# Patient Record
Sex: MALE | Race: WHITE | NOT HISPANIC OR LATINO | Employment: OTHER | URBAN - METROPOLITAN AREA
[De-identification: names, ages, dates, MRNs, and addresses within clinical notes are randomized per-mention and may not be internally consistent; named-entity substitution may affect disease eponyms.]

---

## 2017-04-13 ENCOUNTER — APPOINTMENT (OUTPATIENT)
Dept: LAB | Facility: HOSPITAL | Age: 82
End: 2017-04-13
Attending: SURGERY
Payer: MEDICARE

## 2017-04-13 PROBLEM — N18.4 CHRONIC RENAL FAILURE, STAGE 4 (SEVERE): Status: ACTIVE | Noted: 2017-04-13

## 2017-04-13 PROBLEM — I10 ESSENTIAL HYPERTENSION: Status: ACTIVE | Noted: 2017-04-13

## 2017-04-13 PROBLEM — E11.29 TYPE 2 DIABETES MELLITUS WITH RENAL COMPLICATION: Status: ACTIVE | Noted: 2017-04-13

## 2017-04-13 PROCEDURE — 93005 ELECTROCARDIOGRAM TRACING: CPT

## 2017-04-25 ENCOUNTER — ANESTHESIA EVENT (OUTPATIENT)
Dept: SURGERY | Facility: HOSPITAL | Age: 82
End: 2017-04-25
Payer: MEDICARE

## 2017-04-26 ENCOUNTER — HOSPITAL ENCOUNTER (OUTPATIENT)
Dept: PREADMISSION TESTING | Facility: HOSPITAL | Age: 82
Discharge: HOME | End: 2017-04-26
Attending: SURGERY
Payer: MEDICARE

## 2017-04-26 VITALS
HEART RATE: 53 BPM | HEIGHT: 64 IN | SYSTOLIC BLOOD PRESSURE: 207 MMHG | WEIGHT: 143 LBS | OXYGEN SATURATION: 94 % | RESPIRATION RATE: 16 BRPM | BODY MASS INDEX: 24.41 KG/M2 | DIASTOLIC BLOOD PRESSURE: 68 MMHG

## 2017-04-26 RX ORDER — LIDOCAINE HYDROCHLORIDE 10 MG/ML
1 INJECTION, SOLUTION EPIDURAL; INFILTRATION; INTRACAUDAL; PERINEURAL ONCE
Status: CANCELLED | OUTPATIENT
Start: 2017-04-26 | End: 2017-04-26

## 2017-04-26 RX ORDER — SODIUM CHLORIDE 9 MG/ML
INJECTION, SOLUTION INTRAVENOUS CONTINUOUS
Status: CANCELLED | OUTPATIENT
Start: 2017-04-26

## 2017-04-26 NOTE — DISCHARGE INSTRUCTIONS
Your surgery is scheduled for 5/4/17.    Please report to Outpatient Surgery Intake Office on the 2nd FLOOR at 7:00a.m.          INSTRUCTIONS IMPORTANT!!!  ¨ Do not eat or drink after 12 midnight-including water. OK to brush teeth, no   gum, candy or mints!    ¨ Take only these medicines with a small swallow of water-morning of surgery: Carvedilol and hydralazine      ____  No powder, lotions or creams to surgical area.  ____  Please remove all jewelry, including piercings and leave at home.  ____  No money or valuables needed. Please leave at home.  ____  Please bring any documents given by your doctor.  ____  If going home the same day, arrange for a ride home. You will not be able to             drive if Anesthesia was used.  ____  Wear loose fitting clothing. Allow for dressings, bandages.  ____  Stop Aspirin, Ibuprofen, Motrin and Aleve at least 3-5 days before surgery, unless otherwise instructed by your doctor, or the nurse.   You MAY use Tylenol/acetaminophen until day of surgery.  ____  Wash the surgical area with Hibiclens or Dial hand soap the night before surgery, and again the             morning of surgery.  Be sure to rinse hibiclens off completely (if instructed by nurse).  ____  If you take diabetic medication, do not take am of surgery unless instructed by Doctor.  ____  Call MD for temperature above 101 degrees.  ____ Stop taking any Fish Oil supplement or any Vitamins that contain Vitamin E at least 5 days prior to surgery.  ____ Do Not wear your contact lenses the day of your procedure.  You may wear your glasses.        I have read or had read and explained to me, and understand the above information.  Additional comments or instructions:  For additional questions call [phone]     Take a Hibiclens shower twice a day for 3 days prior to surgery, including the morning of surgery.   Gargle with Listerine twice a day for 3 days prior to surgery, including the morning of surgery.      Pre-Op  Bathing Instructions    Before surgery, you can play an important role in your own health.    Because skin is not sterile, we need to be sure that your skin is as free of germs as possible. By following the instructions below, you can reduce the number of germs on your skin before surgery.    IMPORTANT: You will need to shower with a special soap called Hibiclens*, available at any pharmacy.  If you are allergic to Chlorhexidine (the antiseptic in Hibiclens), use an antibacterial soap such as Dial Soap for your preoperative shower.  You will shower with Hibiclens both the night before your surgery and the morning of your surgery.  Do not use Hibiclens on the head, face or genitals to avoid injury to those areas.    STEP #1: THE NIGHT BEFORE YOUR SURGERY     1. Do not shave the area of your body where your surgery will be performed.  2. Shower and wash your hair and body as usual with your normal soap and shampoo.  3. Rinse your hair and body thoroughly after you shower to remove all soap residue.  4. With your hand, apply one packet of Hibiclens soap to the surgical site.   5. Wash the site gently for five (5) minutes. Do not scrub your skin too hard.   6. Do not wash with your regular soap after Hibiclens is used.  7. Rinse your body thoroughly.  8. Pat yourself dry with a clean, soft towel.  9. Do not use lotion, cream, or powder.  10. Wear clean clothes.    STEP #2: THE MORNING OF YOUR SURGERY     1. Repeat Step #1.    * Not to be used by people allergic to Chlorhexidine.          Anesthesia: Monitored Anesthesia Care (MAC)  Youre due to have surgery. During surgery, youll be given medication called anesthesia. This will keep you comfortable and pain-free. Your surgeon will use monitored anesthesia care (MAC). This sheet tells you more about this type of anesthesia.    What is monitored anesthesia care?  MAC keeps you very drowsy during surgery. You may be awake, but you will likely not remember much. And you  wont feel pain. With MAC, medications are given through an IV line into a vein in your arm or hand. A local anesthetic will usually be injected into the skin and muscle around the surgical site to numb it. The anesthesia provider monitors you during the procedure. He or she checks your heart rate and rhythm, blood pressure, and blood oxygen level.  Anesthesia tools and medications that may be near you during your procedure  You will likely have:  · A pulse oximeter on the end of your finger. This measures your blood oxygen level.  · Electrocardiography leads (electrodes) on your chest. These record your heart rate and rhythm.  · Medications given through an IV. These relax you and prevent pain. You may be awake or sleep lightly. If you have local anesthetic, it is injected directly into your skin.  · A facemask to give you oxygen, if needed.  Risks and Possible Complications  MAC has some risks. These include:  · Breathing problems  · Nausea and vomiting  · Allergic reaction to the anesthetic    Anesthesia safety  · Follow all instructions you are given for how long not to eat or drink before your procedure.  · Be sure your doctor knows what medications you take, especially any anti-inflammatory medication or blood thinners. This includes aspirin and any other over-the-counter medications, herbs, and supplements.  · Have an adult family member or friend drive you home after the procedure.  · For the first 24 hours after your surgery:  ¨ Do not drive or use heavy equipment.  ¨ Do not make important decisions or sign documents.  ¨ Avoid alcohol.  ¨ Have someone stay with you, if possible. They can watch for problems and help keep you safe.  Date Last Reviewed: 10/16/2014  © 8473-2304 [employer] [address] [place] [address], [place] [address]. All rights reserved. This information is not intended as a substitute for professional medical care. Always follow your healthcare professional's  instructions.        Arteriovenous (AV) Fistula for Dialysis  An AV fistula is a connection between an artery and a vein. For this procedure, an AV fistula is surgically created using an artery and a vein in your arm. (Your doctor will let you know if another site is to be used.) When the artery and vein are joined, blood flow increases from the artery into the vein. As a result, the vein enlarges over time. The enlarged vein provides easier access to the blood for dialysis (a treatment for kidney failure). This sheet explains the procedure and what to expect.     An AV fistula increases blood flow from the artery into the vein. Over time, the vein becomes stronger and enlarged.   Preparing for the Procedure  Prepare as you have been told. In addition:  · Tell your doctor about all medications you take. This includes over-the-counter drugs. It also includes herbs and other supplements. You may need to stop taking some or all of them before the procedure.  · Follow any directions youre given for not eating or drinking before the procedure.  · Do not allow anyone to draw blood from or take blood pressure on the arm that will have the fistula prior to the procedure.  The Day of the Procedure  The procedure takes about 1-2 hours. Youll likely go home the same day.  Before the procedure begins:  · An IV line is put into a vein in the arm or hand not being used for the procedure. This line supplies fluids and medications.  · To keep you free of pain during the procedure, youre given general anesthesia. This medication puts you into a state like deep sleep through the procedure. Or a nerve block may be used. This medication numbs the arm. With it, you may also be given medication that makes you relaxed and drowsy through the procedure.  During the procedure:  · The skin over your arm may be injected with numbing medication.  · One or more small incisions are then made through the numbed skin. This depends on the size of  your arm and the depth of the vein in your arm.  · The vein is attached to the selected artery.  · Any incisions made are then closed with sutures (stitches), staples, surgical glue, or strips of surgical tape.  After the procedure:  · Youll be asked to keep your arm raised (elevated) as often as possible for at least a week after the procedure.  · Youll be given medications to manage pain as needed.  · Your arm and hand will be checked to make sure blood is flowing through the fistula properly. The feeling of blood rushing through the fistula is called a thrill. It is somewhat similar to the purring of a cat. Youll be taught how to check for this feeling each day to make sure there are no problems with your fistula. Youll also be taught how to care for your fistula at home.  · When its time for you to leave the hospital, have an adult family member or friend ready to drive you home.  Recovering at Home  Once at home, follow all of the instructions youve been given. Be sure to:  · Take all medications as directed.  · Care for your incision as instructed.  · Check for signs of infection at the incision site (see below).  · Avoid heavy lifting and strenuous activities as directed.  · Monitor and care for your fistula as instructed.  · Do your hand and arm exercises as instructed. This usually involves squeezing a ball in your hand for a few minutes each hour.  Call Your Health Care Provider If You Have Any of the Following:  · Fever of 100.4°F or higher  · Signs of infection at the incision site, such as increased redness or swelling, warmth, worsening pain, bleeding, or foul-smelling drainage  · Lack of a thrill (you cant feel it)  · Pain or numbness in your fingers, hand, or arm  · Bleeding, redness, or warmth around your fistula  · Sudden bulging of the fistula (more than usual; a slight bulge is normal)   Follow-Up  Your health care provider will check your fistula within 1 to 2 weeks after the procedure.  It will likely take about 6 to 8 weeks for the fistula to enlarge enough to start dialysis. After that, make sure the fistula is checked each time you have dialysis. Your health care provider may also suggest checkups every 6 months.  Risks and Possible Complications Include:  · Failure of the fistula to work properly  · Long wait before the fistula is ready (up to 6 months)  · Coldness or numbness in the hand (due to blood flowing away from the hand and into the fistula)  · An unsightly bump under the skin (due to enlargement of the fistula)  · Prolonged bleeding from the fistula after dialysis  · Narrowing or weakening of the blood vessels used for the fistula  · Formation of blood clots in the blood vessels used for the fistula  · Risks of anesthesia or any other medications used during the procedure   Living with an AV Fistula  A problem, such as narrowing of the vein (stricture) or infection, can make the fistula unusable. If this happens, you may need other treatments to repair or make a new fistula. To protect your fistula, follow these and any other guidelines youre given:  · Check your fistula as often as your health care provider says. If you cant feel your thrill, let he or she know right away.  · Make sure your fistula is checked before each dialysis treatment.  · Dont let anyone draw blood from or take blood pressure on the arm that has the fistula.  · Wash your hands often and keep the area around your fistula clean.  · Dont sleep on the arm that has the fistula.  · Dont wear tight jewelry or a watch on the arm with your fistula.  · Protect your fistula from cuts, scrapes, or blows.  Date Last Reviewed: 11/26/2014 © 2000-2016 [employer] [address] [place] [address], [place] [address]. All rights reserved. This information is not intended as a substitute for professional medical care. Always follow your healthcare professional's instructions.

## 2017-04-26 NOTE — ANESTHESIA PREPROCEDURE EVALUATION
04/26/2017     Ab Aldrich is a 89 y.o., male is scheduled for insertion of AV graft under MAC/gen on 5/04/2017.      Past Surgical History:   Procedure Laterality Date    CENTRAL VENOUS CATHETER TUNNELED INSERTION DOUBLE LUMEN Right 02/2017         Anesthesia Evaluation    I have reviewed the Patient Summary Reports.    I have reviewed the Nursing Notes.   I have reviewed the Medications.     Review of Systems  Anesthesia Hx:  No problems with previous Anesthesia  History of prior surgery of interest to airway management or planning:  Denies Personal Hx of Anesthesia complications.   Social:  No Alcohol Use, Former Smoker    Hematology/Oncology:  Hematology Normal        EENT/Dental:  EENT/Dental Normal Eyes: Macular degeneration   Cardiovascular:   Exercise tolerance: poor Hypertension, poorly controlled Denies Dysrhythmias.   Denies Angina.     hyperlipidemia REIS  Functional Capacity very limited / < 3 METS, pt can only walk approx 20 feet    Pulmonary:   Denies Shortness of breath.    Renal/:   Chronic Renal Disease, Dialysis  Kidney Function/Disease  Dialysis Dependent, M-W-F    Hepatic/GI:   GERD, well controlled    Neurological:  Neurology Normal    Endocrine:   Diabetes, well controlled, type 2  Diabetes, Type 2 Diabetes , controlled by oral hypoglycemics. , most recent HgA1c value was pt wife states well controlled; does not recall value        Physical Exam  General:  Well nourished    Airway/Jaw/Neck:  Airway Findings: Mouth Opening: Normal Tongue: Normal  General Airway Assessment: Adult  Mallampati: II  TM Distance: Normal, at least 6 cm  Jaw/Neck Findings:  Neck ROM: Extension Decreased, Severe, Decreased flexion, Decreased Lateral Motion  Neck Findings:  Short Neck     Eyes/Ears/Nose:  EYES/EARS/NOSE FINDINGS: Normal   Dental:  Dental Findings: Periodontal disease, Severe    Chest/Lungs:  Chest/Lungs Findings: Clear to auscultation, Normal Respiratory Rate  Fine crackles bilateral bases     Heart/Vascular:  Heart Findings: Normal Heart murmur: negative Vascular Findings:  Vascular Access: Port-a-Cath  Vascular Exam Findings: Right chest     Abdomen:  Abdomen Findings: Normal    Musculoskeletal:  Musculoskeletal Findings: Thoracic Kyphosis     Mental Status:  Mental Status Findings: Normal      EKG 4/14/2017  Sinus bradycardia with 1st degree A-V block  Left axis deviation  ST and T wave abnormality, consider lateral ischemia  Prolonged QT  Abnormal ECG  No previous ECGs available  Confirmed by Reji Jeronimo MD ([id number]) on 4/14/2017 10:18:04 AM  Reviewed by: Abel Kim MD on 4/14/2017 12:15 PM    Anesthesia Plan  Type of Anesthesia, risks & benefits discussed:  Anesthesia Type:  general, MAC  Patient's Preference:   Intra-op Monitoring Plan:   Intra-op Monitoring Plan Comments:   Post Op Pain Control Plan:   Post Op Pain Control Plan Comments:   Induction:   IV  Beta Blocker:         Informed Consent: Patient understands risks and agrees with Anesthesia plan.  Questions answered.   ASA Score: 3     Day of Surgery Review of History & Physical:        Anesthesia Plan Notes: Anesthesia consent will be obtained prior to procedure on 5/04/2017.          Ready For Surgery From Anesthesia Perspective.

## 2017-04-26 NOTE — IP AVS SNAPSHOT
[facility]  [address]  [place] [address]  Phone: [phone]           Patient Discharge Instructions  Our goal is to set you up for success. This packet includes information on your condition, medications, and your home care. It will help you care for yourself to prevent having to return to the hospital.     Please ask your nurse if you have any questions.      There are many details to remember when preparing for your surgery. Here is what you will need to do, please ask your nurse if there are more specific instructions and if you have any questions:    1. Before procedure Do not smoke or drink alcoholic beverages 24 hours prior to your procedure. Do not eat or drink anything 8 hours before your procedure - this includes gum, mints, and candy.     2. Day of procedure Please remove all jewelry for the procedure. If you wear contact lenses, dentures, hearing aids or glasses, bring a container to put them in during your surgery and give to a family member.  If your doctor has scheduled you for an overnight stay, bring a small overnight bag with any personal items that you need.      3. After procedure  Make arrangements in advance for transportation home by a responsible adult. It is not safe to drive a vehicle during the 24 hours following surgery.     PLEASE NOTE: You may be contacted the day before your surgery to confirm your surgery date and arrival time. The Surgery schedule has many variables which may affect the time of your surgery case. Family members should be available if your surgery time changes.               ** Verify the list of medication(s) below is accurate and up to date. Carry this with you in case of emergency. If your medications have changed, please notify your healthcare provider.             Medication List      TAKE these medications        Additional Info                      allopurinol 100 MG tablet   Commonly known as:  ZYLOPRIM   Refills:  1      Begin Date     AM    Noon    PM    Bedtime       atorvastatin 40 MG tablet   Commonly known as:  LIPITOR   Refills:  1      Begin Date    AM    Noon    PM    Bedtime       carvedilol 6.25 MG tablet   Commonly known as:  COREG   Refills:  2      Begin Date    AM    Noon    PM    Bedtime       hydrALAZINE 25 MG tablet   Commonly known as:  APRESOLINE   Refills:  2      Begin Date    AM    Noon    PM    Bedtime       pioglitazone 45 MG tablet   Commonly known as:  ACTOS   Refills:  3      Begin Date    AM    Noon    PM    Bedtime                  Please bring to all follow up appointments:    1. A copy of your discharge instructions.  2. All medicines you are currently taking in their original bottles.  3. Identification and insurance card.    Please arrive 15 minutes ahead of scheduled appointment time.    Please call 24 hours in advance if you must reschedule your appointment and/or time.        Your Scheduled Appointments     May 02, 2017  2:45 PM CDT   [facility]in [facility]   [facility]    [address]  [place] [address]   [phone]              Your Future Surgeries/Procedures     May 04, 2017   Surgery with Abel Kim MD   [facility] ([facility])    [address]  [place] [address]   [phone]                  Discharge Instructions       Your surgery is scheduled for 5/4/17.    Please report to Outpatient Surgery Intake Office on the 2nd FLOOR at 7:00a.m.          INSTRUCTIONS IMPORTANT!!!  ¨ Do not eat or drink after 12 midnight-including water. OK to brush teeth, no   gum, candy or mints!    ¨ Take only these medicines with a small swallow of water-morning of surgery: Carvedilol and hydralazine      ____  No powder, lotions or creams to surgical area.  ____  Please remove all jewelry, including piercings and leave at home.  ____  No money or valuables needed. Please leave at home.  ____  Please bring any documents  given by your doctor.  ____  If going home the same day, arrange for a ride home. You will not be able to             drive if Anesthesia was used.  ____  Wear loose fitting clothing. Allow for dressings, bandages.  ____  Stop Aspirin, Ibuprofen, Motrin and Aleve at least 3-5 days before surgery, unless otherwise instructed by your doctor, or the nurse.   You MAY use Tylenol/acetaminophen until day of surgery.  ____  Wash the surgical area with Hibiclens or Dial hand soap the night before surgery, and again the             morning of surgery.  Be sure to rinse hibiclens off completely (if instructed by nurse).  ____  If you take diabetic medication, do not take am of surgery unless instructed by Doctor.  ____  Call MD for temperature above 101 degrees.  ____ Stop taking any Fish Oil supplement or any Vitamins that contain Vitamin E at least 5 days prior to surgery.  ____ Do Not wear your contact lenses the day of your procedure.  You may wear your glasses.        I have read or had read and explained to me, and understand the above information.  Additional comments or instructions:  For additional questions call [phone]     Take a Hibiclens shower twice a day for 3 days prior to surgery, including the morning of surgery.   Gargle with Listerine twice a day for 3 days prior to surgery, including the morning of surgery.      Pre-Op Bathing Instructions    Before surgery, you can play an important role in your own health.    Because skin is not sterile, we need to be sure that your skin is as free of germs as possible. By following the instructions below, you can reduce the number of germs on your skin before surgery.    IMPORTANT: You will need to shower with a special soap called Hibiclens*, available at any pharmacy.  If you are allergic to Chlorhexidine (the antiseptic in Hibiclens), use an antibacterial soap such as Dial Soap for your preoperative shower.  You will shower with Hibiclens both the night before  your surgery and the morning of your surgery.  Do not use Hibiclens on the head, face or genitals to avoid injury to those areas.    STEP #1: THE NIGHT BEFORE YOUR SURGERY     1. Do not shave the area of your body where your surgery will be performed.  2. Shower and wash your hair and body as usual with your normal soap and shampoo.  3. Rinse your hair and body thoroughly after you shower to remove all soap residue.  4. With your hand, apply one packet of Hibiclens soap to the surgical site.   5. Wash the site gently for five (5) minutes. Do not scrub your skin too hard.   6. Do not wash with your regular soap after Hibiclens is used.  7. Rinse your body thoroughly.  8. Pat yourself dry with a clean, soft towel.  9. Do not use lotion, cream, or powder.  10. Wear clean clothes.    STEP #2: THE MORNING OF YOUR SURGERY     1. Repeat Step #1.    * Not to be used by people allergic to Chlorhexidine.          Anesthesia: Monitored Anesthesia Care (MAC)  Youre due to have surgery. During surgery, youll be given medication called anesthesia. This will keep you comfortable and pain-free. Your surgeon will use monitored anesthesia care (MAC). This sheet tells you more about this type of anesthesia.    What is monitored anesthesia care?  MAC keeps you very drowsy during surgery. You may be awake, but you will likely not remember much. And you wont feel pain. With MAC, medications are given through an IV line into a vein in your arm or hand. A local anesthetic will usually be injected into the skin and muscle around the surgical site to numb it. The anesthesia provider monitors you during the procedure. He or she checks your heart rate and rhythm, blood pressure, and blood oxygen level.  Anesthesia tools and medications that may be near you during your procedure  You will likely have:  · A pulse oximeter on the end of your finger. This measures your blood oxygen level.  · Electrocardiography leads (electrodes) on your chest.  These record your heart rate and rhythm.  · Medications given through an IV. These relax you and prevent pain. You may be awake or sleep lightly. If you have local anesthetic, it is injected directly into your skin.  · A facemask to give you oxygen, if needed.  Risks and Possible Complications  MAC has some risks. These include:  · Breathing problems  · Nausea and vomiting  · Allergic reaction to the anesthetic    Anesthesia safety  · Follow all instructions you are given for how long not to eat or drink before your procedure.  · Be sure your doctor knows what medications you take, especially any anti-inflammatory medication or blood thinners. This includes aspirin and any other over-the-counter medications, herbs, and supplements.  · Have an adult family member or friend drive you home after the procedure.  · For the first 24 hours after your surgery:  ¨ Do not drive or use heavy equipment.  ¨ Do not make important decisions or sign documents.  ¨ Avoid alcohol.  ¨ Have someone stay with you, if possible. They can watch for problems and help keep you safe.  Date Last Reviewed: 10/16/2014  © 6359-3790 [employer]. [address] [place] [address]. All rights reserved. This information is not intended as a substitute for professional medical care. Always follow your healthcare professional's instructions.        Arteriovenous (AV) Fistula for Dialysis  An AV fistula is a connection between an artery and a vein. For this procedure, an AV fistula is surgically created using an artery and a vein in your arm. (Your doctor will let you know if another site is to be used.) When the artery and vein are joined, blood flow increases from the artery into the vein. As a result, the vein enlarges over time. The enlarged vein provides easier access to the blood for dialysis (a treatment for kidney failure). This sheet explains the procedure and what to expect.     An AV fistula increases blood flow from the  artery into the vein. Over time, the vein becomes stronger and enlarged.   Preparing for the Procedure  Prepare as you have been told. In addition:  · Tell your doctor about all medications you take. This includes over-the-counter drugs. It also includes herbs and other supplements. You may need to stop taking some or all of them before the procedure.  · Follow any directions youre given for not eating or drinking before the procedure.  · Do not allow anyone to draw blood from or take blood pressure on the arm that will have the fistula prior to the procedure.  The Day of the Procedure  The procedure takes about 1-2 hours. Youll likely go home the same day.  Before the procedure begins:  · An IV line is put into a vein in the arm or hand not being used for the procedure. This line supplies fluids and medications.  · To keep you free of pain during the procedure, youre given general anesthesia. This medication puts you into a state like deep sleep through the procedure. Or a nerve block may be used. This medication numbs the arm. With it, you may also be given medication that makes you relaxed and drowsy through the procedure.  During the procedure:  · The skin over your arm may be injected with numbing medication.  · One or more small incisions are then made through the numbed skin. This depends on the size of your arm and the depth of the vein in your arm.  · The vein is attached to the selected artery.  · Any incisions made are then closed with sutures (stitches), staples, surgical glue, or strips of surgical tape.  After the procedure:  · Youll be asked to keep your arm raised (elevated) as often as possible for at least a week after the procedure.  · Youll be given medications to manage pain as needed.  · Your arm and hand will be checked to make sure blood is flowing through the fistula properly. The feeling of blood rushing through the fistula is called a thrill. It is somewhat similar to the purring of a  cat. Youll be taught how to check for this feeling each day to make sure there are no problems with your fistula. Youll also be taught how to care for your fistula at home.  · When its time for you to leave the hospital, have an adult family member or friend ready to drive you home.  Recovering at Home  Once at home, follow all of the instructions youve been given. Be sure to:  · Take all medications as directed.  · Care for your incision as instructed.  · Check for signs of infection at the incision site (see below).  · Avoid heavy lifting and strenuous activities as directed.  · Monitor and care for your fistula as instructed.  · Do your hand and arm exercises as instructed. This usually involves squeezing a ball in your hand for a few minutes each hour.  Call Your Health Care Provider If You Have Any of the Following:  · Fever of 100.4°F or higher  · Signs of infection at the incision site, such as increased redness or swelling, warmth, worsening pain, bleeding, or foul-smelling drainage  · Lack of a thrill (you cant feel it)  · Pain or numbness in your fingers, hand, or arm  · Bleeding, redness, or warmth around your fistula  · Sudden bulging of the fistula (more than usual; a slight bulge is normal)   Follow-Up  Your health care provider will check your fistula within 1 to 2 weeks after the procedure. It will likely take about 6 to 8 weeks for the fistula to enlarge enough to start dialysis. After that, make sure the fistula is checked each time you have dialysis. Your health care provider may also suggest checkups every 6 months.  Risks and Possible Complications Include:  · Failure of the fistula to work properly  · Long wait before the fistula is ready (up to 6 months)  · Coldness or numbness in the hand (due to blood flowing away from the hand and into the fistula)  · An unsightly bump under the skin (due to enlargement of the fistula)  · Prolonged bleeding from the fistula after dialysis  · Narrowing  or weakening of the blood vessels used for the fistula  · Formation of blood clots in the blood vessels used for the fistula  · Risks of anesthesia or any other medications used during the procedure   Living with an AV Fistula  A problem, such as narrowing of the vein (stricture) or infection, can make the fistula unusable. If this happens, you may need other treatments to repair or make a new fistula. To protect your fistula, follow these and any other guidelines youre given:  · Check your fistula as often as your health care provider says. If you cant feel your thrill, let he or she know right away.  · Make sure your fistula is checked before each dialysis treatment.  · Dont let anyone draw blood from or take blood pressure on the arm that has the fistula.  · Wash your hands often and keep the area around your fistula clean.  · Dont sleep on the arm that has the fistula.  · Dont wear tight jewelry or a watch on the arm with your fistula.  · Protect your fistula from cuts, scrapes, or blows.  Date Last Reviewed: 11/26/2014  © 7975-4294 [employer] [address] [place] [address]. All rights reserved. This information is not intended as a substitute for professional medical care. Always follow your healthcare professional's instructions.            Admission Information     Date & Time Provider Department CSN    4/26/2017  9:00 AM Abel Kim MD [facility] [id number]      Care Providers     Provider Role Specialty Primary office phone    Abel Kim MD Attending Provider General Surgery [phone]      Recent Lab Values     No lab values to display.      Allergies as of 4/26/2017     No Known Allergies      [facility]     [facility] Nurse Care Line - 24/7 Assistance  Unless otherwise directed by your provider, please contact [facility], our nurse care line that is available for 24/7 assistance.     Registered nurses in the [facility] provide clinical advisement, health education, appointment booking, and other advisory services.  Call for this free service at [phone].        Advance Directives     An advance directive is a document which, in the event you are no longer able to make decisions for yourself, tells your healthcare team what kind of treatment you do or do not want to receive, or who you would like to make those decisions for you.  If you do not currently have an advance directive, [facility] encourages you to create one.  For more information call:  [phone]), [phone] ([phone]),  or log on to [email or web address].        Smoking Cessation     If you would like to quit smoking:   You may be eligible for free services if you are a [place] resident and started smoking cigarettes before September 1, 1988.  Call the Smoking Cessation Trust ([facility]) toll free at (577) 114-5532 or (242) 175-0079.   Call 4-368-QUIT-NOW if you do not meet the above criteria.   Contact us via email: [email or web address]   View our website for more information: [email or web address]        Language Assistance Services     ATTENTION: Language assistance services are available, free of charge. Please call [phone].      ATENCIÓN: Si habla español, tiene a swartz disposición servicios gratuitos de asistencia lingüística. Llame al [phone].     CHÚ Ý: N?u b?n nói Ti?ng Vi?t, có các d?ch v? h? tr? ngôn ng? mi?n phí dành cho b?n. G?i s? [phone].        Chronic Kindey Disease Education             Diabetes Discharge Instructions                                   [facility] Sign-Up     Activating your [facility] account is as easy as 1-2-3!     1) Visit [email or web address], select Sign Up Now, enter this activation code and your date of birth, then select Next.  [email or web address]  Expires: 5/28/2017  3:20 PM      2) Create a username and password to use when you visit [facility] in the future and select  a security question in case you lose your password and select Next.    3) Enter your e-mail address and click Sign Up!    Additional Information  If you have questions, please e-mail [email or web address] or call [phone] to talk to our [facility] staff. Remember, [facility] is NOT to be used for urgent needs. For medical emergencies, dial 911.          [facility] complies with applicable Federal civil rights laws and does not discriminate on the basis of race, color, national origin, age, disability, or sex.

## 2017-05-02 ENCOUNTER — HOSPITAL ENCOUNTER (OUTPATIENT)
Dept: RADIOLOGY | Facility: HOSPITAL | Age: 82
Discharge: HOME | End: 2017-05-02
Attending: SURGERY
Payer: MEDICARE

## 2017-05-02 DIAGNOSIS — Z79.4 TYPE 2 DIABETES MELLITUS WITH STAGE 4 CHRONIC KIDNEY DISEASE, WITH LONG-TERM CURRENT USE OF INSULIN: ICD-10-CM

## 2017-05-02 DIAGNOSIS — N18.4 TYPE 2 DIABETES MELLITUS WITH STAGE 4 CHRONIC KIDNEY DISEASE, WITH LONG-TERM CURRENT USE OF INSULIN: ICD-10-CM

## 2017-05-02 DIAGNOSIS — E11.22 TYPE 2 DIABETES MELLITUS WITH STAGE 4 CHRONIC KIDNEY DISEASE, WITH LONG-TERM CURRENT USE OF INSULIN: ICD-10-CM

## 2017-05-02 DIAGNOSIS — I10 ESSENTIAL HYPERTENSION: ICD-10-CM

## 2017-05-02 DIAGNOSIS — N18.4 CHRONIC RENAL FAILURE, STAGE 4 (SEVERE): ICD-10-CM

## 2017-05-02 PROCEDURE — G0365 VESSEL MAPPING HEMO ACCESS: HCPCS | Mod: 26,,, | Performed by: RADIOLOGY

## 2017-05-02 PROCEDURE — G0365 VESSEL MAPPING HEMO ACCESS: HCPCS | Mod: TC

## 2017-05-04 ENCOUNTER — ANESTHESIA (OUTPATIENT)
Dept: SURGERY | Facility: HOSPITAL | Age: 82
End: 2017-05-04
Payer: MEDICARE

## 2017-05-04 ENCOUNTER — SURGERY (OUTPATIENT)
Age: 82
End: 2017-05-04

## 2017-05-04 ENCOUNTER — HOSPITAL ENCOUNTER (OUTPATIENT)
Facility: HOSPITAL | Age: 82
Discharge: HOME | End: 2017-05-04
Attending: SURGERY | Admitting: SURGERY
Payer: MEDICARE

## 2017-05-04 VITALS
BODY MASS INDEX: 25.95 KG/M2 | WEIGHT: 141 LBS | RESPIRATION RATE: 18 BRPM | TEMPERATURE: 98 F | SYSTOLIC BLOOD PRESSURE: 148 MMHG | HEART RATE: 66 BPM | DIASTOLIC BLOOD PRESSURE: 67 MMHG | OXYGEN SATURATION: 95 % | HEIGHT: 62 IN

## 2017-05-04 DIAGNOSIS — N18.4 CHRONIC RENAL FAILURE, STAGE 4 (SEVERE): ICD-10-CM

## 2017-05-04 DIAGNOSIS — E11.22 TYPE 2 DIABETES MELLITUS WITH STAGE 4 CHRONIC KIDNEY DISEASE, WITH LONG-TERM CURRENT USE OF INSULIN: ICD-10-CM

## 2017-05-04 DIAGNOSIS — I10 ESSENTIAL HYPERTENSION: ICD-10-CM

## 2017-05-04 DIAGNOSIS — N18.4 TYPE 2 DIABETES MELLITUS WITH STAGE 4 CHRONIC KIDNEY DISEASE, WITH LONG-TERM CURRENT USE OF INSULIN: ICD-10-CM

## 2017-05-04 DIAGNOSIS — Z79.4 TYPE 2 DIABETES MELLITUS WITH STAGE 4 CHRONIC KIDNEY DISEASE, WITH LONG-TERM CURRENT USE OF INSULIN: ICD-10-CM

## 2017-05-04 LAB
ANION GAP SERPL CALC-SCNC: 8 MMOL/L
BUN SERPL-MCNC: 31 MG/DL
CALCIUM SERPL-MCNC: 8.5 MG/DL
CHLORIDE SERPL-SCNC: 103 MMOL/L
CO2 SERPL-SCNC: 25 MMOL/L
CREAT SERPL-MCNC: 3 MG/DL
EST. GFR  (AFRICAN AMERICAN): 20 ML/MIN/1.73 M^2
EST. GFR  (NON AFRICAN AMERICAN): 18 ML/MIN/1.73 M^2
GLUCOSE SERPL-MCNC: 135 MG/DL
POCT GLUCOSE: 134 MG/DL (ref 70–110)
POTASSIUM SERPL-SCNC: 4 MMOL/L
SODIUM SERPL-SCNC: 136 MMOL/L

## 2017-05-04 PROCEDURE — 37000009 HC ANESTHESIA EA ADD 15 MINS: Performed by: SURGERY

## 2017-05-04 PROCEDURE — 37000008 HC ANESTHESIA 1ST 15 MINUTES: Performed by: SURGERY

## 2017-05-04 PROCEDURE — 25000003 PHARM REV CODE 250: Performed by: NURSE PRACTITIONER

## 2017-05-04 PROCEDURE — 80048 BASIC METABOLIC PNL TOTAL CA: CPT

## 2017-05-04 PROCEDURE — 63600175 PHARM REV CODE 636 W HCPCS: Performed by: NURSE ANESTHETIST, CERTIFIED REGISTERED

## 2017-05-04 PROCEDURE — 71000015 HC POSTOP RECOV 1ST HR: Performed by: SURGERY

## 2017-05-04 PROCEDURE — 36415 COLL VENOUS BLD VENIPUNCTURE: CPT

## 2017-05-04 PROCEDURE — 36000707: Performed by: SURGERY

## 2017-05-04 PROCEDURE — 25000003 PHARM REV CODE 250: Performed by: SURGERY

## 2017-05-04 PROCEDURE — 27201423 OPTIME MED/SURG SUP & DEVICES STERILE SUPPLY: Performed by: SURGERY

## 2017-05-04 PROCEDURE — 36000706: Performed by: SURGERY

## 2017-05-04 PROCEDURE — 63600175 PHARM REV CODE 636 W HCPCS: Performed by: SURGERY

## 2017-05-04 PROCEDURE — 25000003 PHARM REV CODE 250: Performed by: NURSE ANESTHETIST, CERTIFIED REGISTERED

## 2017-05-04 RX ORDER — SODIUM CHLORIDE 9 MG/ML
INJECTION, SOLUTION INTRAVENOUS CONTINUOUS
Status: DISCONTINUED | OUTPATIENT
Start: 2017-05-04 | End: 2017-05-04

## 2017-05-04 RX ORDER — PROPOFOL 10 MG/ML
VIAL (ML) INTRAVENOUS
Status: DISCONTINUED | OUTPATIENT
Start: 2017-05-04 | End: 2017-05-04

## 2017-05-04 RX ORDER — LIDOCAINE HCL/PF 100 MG/5ML
SYRINGE (ML) INTRAVENOUS
Status: DISCONTINUED | OUTPATIENT
Start: 2017-05-04 | End: 2017-05-04

## 2017-05-04 RX ORDER — PROPOFOL 10 MG/ML
VIAL (ML) INTRAVENOUS CONTINUOUS PRN
Status: DISCONTINUED | OUTPATIENT
Start: 2017-05-04 | End: 2017-05-04

## 2017-05-04 RX ORDER — HEPARIN SODIUM 1000 [USP'U]/ML
INJECTION, SOLUTION INTRAVENOUS; SUBCUTANEOUS
Status: DISCONTINUED | OUTPATIENT
Start: 2017-05-04 | End: 2017-05-04 | Stop reason: HOSPADM

## 2017-05-04 RX ORDER — HYDROCODONE BITARTRATE AND ACETAMINOPHEN 5; 325 MG/1; MG/1
1 TABLET ORAL EVERY 6 HOURS PRN
Qty: 20 TABLET | Refills: 0
Start: 2017-05-04 | End: 2018-02-04

## 2017-05-04 RX ORDER — LIDOCAINE HYDROCHLORIDE 10 MG/ML
1 INJECTION, SOLUTION EPIDURAL; INFILTRATION; INTRACAUDAL; PERINEURAL ONCE
Status: DISCONTINUED | OUTPATIENT
Start: 2017-05-04 | End: 2017-05-04 | Stop reason: HOSPADM

## 2017-05-04 RX ORDER — SODIUM CHLORIDE 9 MG/ML
INJECTION, SOLUTION INTRAVENOUS CONTINUOUS
Status: DISCONTINUED | OUTPATIENT
Start: 2017-05-04 | End: 2017-05-04 | Stop reason: HOSPADM

## 2017-05-04 RX ORDER — LIDOCAINE HYDROCHLORIDE 10 MG/ML
INJECTION, SOLUTION EPIDURAL; INFILTRATION; INTRACAUDAL; PERINEURAL
Status: DISCONTINUED | OUTPATIENT
Start: 2017-05-04 | End: 2017-05-04 | Stop reason: HOSPADM

## 2017-05-04 RX ORDER — CEFAZOLIN SODIUM 2 G/50ML
2 SOLUTION INTRAVENOUS
Status: COMPLETED | OUTPATIENT
Start: 2017-05-04 | End: 2017-05-04

## 2017-05-04 RX ORDER — BACITRACIN 50000 [IU]/1
INJECTION, POWDER, FOR SOLUTION INTRAMUSCULAR
Status: DISCONTINUED | OUTPATIENT
Start: 2017-05-04 | End: 2017-05-04 | Stop reason: HOSPADM

## 2017-05-04 RX ORDER — HYDROCODONE BITARTRATE AND ACETAMINOPHEN 5; 325 MG/1; MG/1
1 TABLET ORAL EVERY 4 HOURS PRN
Status: DISCONTINUED | OUTPATIENT
Start: 2017-05-04 | End: 2017-05-04 | Stop reason: HOSPADM

## 2017-05-04 RX ADMIN — CEFAZOLIN SODIUM 2 G: 2 SOLUTION INTRAVENOUS at 11:05

## 2017-05-04 RX ADMIN — HEPARIN SODIUM 5000 UNITS: 1000 INJECTION, SOLUTION INTRAVENOUS; SUBCUTANEOUS at 11:05

## 2017-05-04 RX ADMIN — LIDOCAINE HYDROCHLORIDE 25 MG: 20 INJECTION, SOLUTION INTRAVENOUS at 11:05

## 2017-05-04 RX ADMIN — SODIUM CHLORIDE: 0.9 INJECTION, SOLUTION INTRAVENOUS at 11:05

## 2017-05-04 RX ADMIN — LIDOCAINE HYDROCHLORIDE 10 ML: 10 INJECTION, SOLUTION EPIDURAL; INFILTRATION; INTRACAUDAL; PERINEURAL at 12:05

## 2017-05-04 RX ADMIN — PROPOFOL 100 MCG/KG/MIN: 10 INJECTION, EMULSION INTRAVENOUS at 11:05

## 2017-05-04 RX ADMIN — PROPOFOL 30 MG: 10 INJECTION, EMULSION INTRAVENOUS at 11:05

## 2017-05-04 RX ADMIN — BACITRACIN 50000 UNITS: 5000 INJECTION, POWDER, FOR SOLUTION INTRAMUSCULAR at 11:05

## 2017-05-04 NOTE — PLAN OF CARE
Criteria met for discharge. IV removed, instructions explained,copy given with RX. Pt and family all verbalize understanding of instructions, have no further questions. Denies pain,tolerating po well. Discharged home with family in good condition.

## 2017-05-04 NOTE — DISCHARGE INSTRUCTIONS
Arteriovenous (AV) Fistula for Dialysis  An AV fistula is a connection between an artery and a vein. For this procedure, an AV fistula is surgically created using an artery and a vein in your arm. (Your doctor will let you know if another site is to be used.) When the artery and vein are joined, blood flow increases from the artery into the vein. As a result, the vein enlarges over time. The enlarged vein provides easier access to the blood for dialysis (a treatment for kidney failure). This sheet explains the procedure and what to expect.       An AV fistula increases blood flow from the artery into the vein. Over time, the vein becomes stronger and enlarged.     Preparing for the Procedure  Prepare as you have been told. In addition:  · Tell your doctor about all medications you take. This includes over-the-counter drugs. It also includes herbs and other supplements. You may need to stop taking some or all of them before the procedure.  · Follow any directions youre given for not eating or drinking before the procedure.  · Do not allow anyone to draw blood from or take blood pressure on the arm that will have the fistula prior to the procedure.  ·   The Day of the Procedure  The procedure takes about 1-2 hours. Youll likely go home the same day.  Before the procedure begins:  · An IV line is put into a vein in the arm or hand not being used for the procedure. This line supplies fluids and medications.  · To keep you free of pain during the procedure, youre given general anesthesia. This medication puts you into a state like deep sleep through the procedure. Or a nerve block may be used. This medication numbs the arm. With it, you may also be given medication that makes you relaxed and drowsy through the procedure.  ·   During the procedure:  · The skin over your arm may be injected with numbing medication.  · One or more small incisions are then made through the numbed skin. This depends on the size of your  arm and the depth of the vein in your arm.  · The vein is attached to the selected artery.  · Any incisions made are then closed with sutures (stitches), staples, surgical glue, or strips of surgical tape.  ·   After the procedure:  · Youll be asked to keep your arm raised (elevated) as often as possible for at least a week after the procedure.  · Youll be given medications to manage pain as needed.  · Your arm and hand will be checked to make sure blood is flowing through the fistula properly. The feeling of blood rushing through the fistula is called a thrill. It is somewhat similar to the purring of a cat. Youll be taught how to check for this feeling each day to make sure there are no problems with your fistula. Youll also be taught how to care for your fistula at home.  · When its time for you to leave the hospital, have an adult family member or friend ready to drive you home.  ·   Recovering at Home  Once at home, follow all of the instructions youve been given. Be sure to:  · Take all medications as directed.  · Care for your incision as instructed.  · Check for signs of infection at the incision site (see below).  · Avoid heavy lifting and strenuous activities as directed.  · Monitor and care for your fistula as instructed.  · Do your hand and arm exercises as instructed. This usually involves squeezing a ball in your hand for a few minutes each hour.  ·   Call Your Health Care Provider If You Have Any of the Following:  · Fever of 100.4°F or higher  · Signs of infection at the incision site, such as increased redness or swelling, warmth, worsening pain, bleeding, or foul-smelling drainage  · Lack of a thrill (you cant feel it)  · Pain or numbness in your fingers, hand, or arm  · Bleeding, redness, or warmth around your fistula  · Sudden bulging of the fistula (more than usual; a slight bulge is normal)   Follow-Up  Your health care provider will check your fistula within 1 to 2 weeks after the  procedure. It will likely take about 6 to 8 weeks for the fistula to enlarge enough to start dialysis. After that, make sure the fistula is checked each time you have dialysis. Your health care provider may also suggest checkups every 6 months.  Risks and Possible Complications Include:  · Failure of the fistula to work properly  · Long wait before the fistula is ready (up to 6 months)  · Coldness or numbness in the hand (due to blood flowing away from the hand and into the fistula)  · An unsightly bump under the skin (due to enlargement of the fistula)  · Prolonged bleeding from the fistula after dialysis  · Narrowing or weakening of the blood vessels used for the fistula  · Formation of blood clots in the blood vessels used for the fistula  · Risks of anesthesia or any other medications used during the procedure       Living with an AV Fistula  A problem, such as narrowing of the vein (stricture) or infection, can make the fistula unusable. If this happens, you may need other treatments to repair or make a new fistula. To protect your fistula, follow these and any other guidelines youre given:  · Check your fistula as often as your health care provider says. If you cant feel your thrill, let he or she know right away.  · Make sure your fistula is checked before each dialysis treatment.  · Dont let anyone draw blood from or take blood pressure on the arm that has the fistula.  · Wash your hands often and keep the area around your fistula clean.  · Dont sleep on the arm that has the fistula.  · Dont wear tight jewelry or a watch on the arm with your fistula.  · Protect your fistula from cuts, scrapes, or blows.    Notify Dr. Kim for any problems or concerns.  May remove the dressing in 2 days. Then apply band aids to site until completed dry and starting to scab.

## 2017-05-04 NOTE — BRIEF OP NOTE
[facility]  Brief Operative Note     SUMMARY     Surgery Date: 5/4/2017     Surgeon(s) and Role:     * Abel Kim MD - Primary    Assisting Surgeon: None    Pre-op Diagnosis:  Essential hypertension [I10]  Chronic renal failure, stage 4 (severe) [N18.4]  Type 2 diabetes mellitus with stage 4 chronic kidney disease, with long-term current use of insulin [E11.22, N18.4, Z79.4]    Post-op Diagnosis:  Post-Op Diagnosis Codes:     * Essential hypertension [I10]     * Chronic renal failure, stage 4 (severe) [N18.4]     * Type 2 diabetes mellitus with stage 4 chronic kidney disease, with long-term current use of insulin [E11.22, N18.4, Z79.4]    Procedure(s) (LRB):  [id number] (Left)    Anesthesia: Local MAC    Description of the findings of the procedure:  Av fistula left arm with cephalic vein mobilization    Findings/Key Components: dictated    Estimated Blood Loss: 10 cc         Specimens:   Specimen     None          Discharge Note    SUMMARY     Admit Date: 5/4/2017    Discharge Date and Time:  05/04/2017 12:36 PM    Hospital Course (synopsis of major diagnoses, care, treatment, and services provided during the course of the hospital stay): patient tolerted well and was sent to recovery room in stable condition.     Final Diagnosis: Post-Op Diagnosis Codes:     * Essential hypertension [I10]     * Chronic renal failure, stage 4 (severe) [N18.4]     * Type 2 diabetes mellitus with stage 4 chronic kidney disease, with long-term current use of insulin [E11.22, N18.4, Z79.4]    Disposition: Home or Self Care    Follow Up/Patient Instructions:     Medications:  Reconciled Home Medications:   Current Discharge Medication List      START taking these medications    Details   hydrocodone-acetaminophen 5-325mg (NORCO) 5-325 mg per tablet Take 1 tablet by mouth every 6 (six) hours as needed for Pain.  Qty: 20 tablet, Refills: 0         CONTINUE these medications which have NOT CHANGED     Details   carvedilol (COREG) 6.25 MG tablet Refills: 2      hydrALAZINE (APRESOLINE) 25 MG tablet Refills: 2      allopurinol (ZYLOPRIM) 100 MG tablet Refills: 1      atorvastatin (LIPITOR) 40 MG tablet Refills: 1      pioglitazone (ACTOS) 45 MG tablet Refills: 3             Discharge Procedure Orders  Diet general     Activity as tolerated     Keep surgical extremity elevated     Lifting restrictions     Call MD for:  temperature >100.4     Call MD for:  persistent nausea and vomiting     Call MD for:  severe uncontrolled pain     Call MD for:  redness, tenderness, or signs of infection (pain, swelling, redness, odor or green/yellow discharge around incision site)     Leave dressing on - Keep it clean, dry, and intact until clinic visit       Follow-up Information     Follow up with Abel Kim MD In 1 week.    Specialties:  General Surgery, Surgery    Contact information:    [address]  [address]  [place] [address] [phone]

## 2017-05-04 NOTE — TRANSFER OF CARE
"Anesthesia Transfer of Care Note    Patient: Ab Aldrich    Procedure(s) Performed: Procedure(s) (LRB):  [id number] (Left)    Patient location: [facility]    Anesthesia Type: MAC    Transport from OR: Transported from OR on room air with adequate spontaneous ventilation    Post pain: adequate analgesia    Post assessment: no apparent anesthetic complications    Post vital signs: stable    Level of consciousness: awake and alert    Nausea/Vomiting: no nausea/vomiting    Complications: none    Transfer of care protocol was followed      Last vitals:   Visit Vitals    BP (!) 183/83 (BP Location: Right arm, Patient Position: Lying, BP Method: Automatic)    Pulse 68    Temp 36.4 °C (97.5 °F) (Oral)    Resp 16    Ht 5' 2" (1.575 m)    Wt 64 kg (141 lb)    SpO2 95%    BMI 25.79 kg/m2     "

## 2017-05-04 NOTE — OP NOTE
DATE OF PROCEDURE:  05/04/2017.    PREOPERATIVE DIAGNOSES:  Diabetes, hypertension, chronic renal failure, CHF.    POSTOPERATIVE DIAGNOSES:  Diabetes, hypertension, chronic renal failure, CHF.    OPERATION:  Creation AV fistula left arm with cephalic vein mobilization.    SURGEON:  Abel Kim M.D.    ANESTHESIA:  Xylocaine 1% with IV sedation.    PROCEDURE IN DETAIL:  After satisfactory IV sedation, the patient was   positioned.  Left arm and forearm was prepped and draped in normal sterile   manner using ChloraPrep.  A stockinette was applied.  The area of the left   antecubital fossa was then infiltrated using 1% Xylocaine solution.  Dissection   was carried down.  Deep subcutaneous clamped and bovied, further taken down.    Laterally cephalic vein was isolated, mobilized, completely was ligated   distally.  Adequate segment was mobilized.  Vein was then dilated up to a size   3.5-mm using vein dilator.  Brachial artery was then isolated on the medial   side.  End-to-side anastomosis was created to the mobilized artery using running   6-0 Prolene suture.  The patient had good bruit after completion of procedure.    Hemostasis satisfactorily maintained.  Wound was irrigated with antibiotic   solution and then approximated using interrupted 3-0 Vicryl for subcutaneous   tissue.  The skin was closed with running 4-0 nylon suture.  The patient had   good bruit after completion of procedure.  Hemostasis satisfactorily maintained.    Sterile gauze dressing was applied.  Estimated blood loss was 10 mL.  No   specimen was removed.  The patient was sent to Recovery Room in stable   condition.      LUZ  dd: 05/04/2017 12:39:52 (CDT)  td: 05/04/2017 15:54:28 (CDT)  Doc ID   #[id number]  Job ID #[id number]    CC:

## 2017-05-04 NOTE — ANESTHESIA POSTPROCEDURE EVALUATION
"Anesthesia Post Evaluation    Patient: Ab Aldrich    Procedure(s) Performed: Procedure(s) (LRB):  [id number] (Left)    Final Anesthesia Type: MAC  Patient location during evaluation: [facility]  Patient participation: Yes- Able to Participate  Level of consciousness: awake and alert  Post-procedure vital signs: reviewed and stable  Pain management: adequate  Airway patency: patent  PONV status at discharge: No PONV  Anesthetic complications: no      Cardiovascular status: hemodynamically stable  Respiratory status: unassisted, spontaneous ventilation and room air  Hydration status: euvolemic  Follow-up not needed.        Visit Vitals    BP (!) 183/83 (BP Location: Right arm, Patient Position: Lying, BP Method: Automatic)    Pulse 68    Temp 36.4 °C (97.5 °F) (Oral)    Resp 16    Ht 5' 2" (1.575 m)    Wt 64 kg (141 lb)    SpO2 95%    BMI 25.79 kg/m2       Pain/Amol Score: Pain Assessment Performed: Yes (5/4/2017  8:52 AM)  Presence of Pain: denies (5/4/2017  8:52 AM)      "

## 2017-05-04 NOTE — IP AVS SNAPSHOT
\[facility]\""  [address]  [place] [address]  Phone: [phone]           Patient Discharge Instructions   Our goal is to set you up for success. This packet includes information on your condition, medications, and your home care.  It will help you care for yourself to prevent having to return to the hospital.     Please ask your nurse if you have any questions.      There are many details to remember when preparing to leave the hospital. Here is what you will need to do:    1. Take your medicine. If you are prescribed medications, review your Medication List on the following pages. You may have new medications to  at the pharmacy and others that you'll need to stop taking. Review the instructions for how and when to take your medications. Talk with your doctor or nurses if you are unsure of what to do.     2. Go to your follow-up appointments. Specific follow-up information is listed in the following pages. Your may be contacted by a nurse or clinical provider about future appointments. Be sure we have all of the phone numbers to reach you. Please contact your provider's office if you are unable to make an appointment.     3. Watch for warning signs. Your doctor or nurse will give you detailed warning signs to watch for and when to call for assistance. These instructions may also include educational information about your condition. If you experience any of warning signs to your health, call your doctor.               ** Verify the list of medication(s) below is accurate and up to date. Carry this with you in case of emergency. If your medications have changed, please notify your healthcare provider.             Medication List      START taking these medications        Additional Info                      hydrocodone-acetaminophen 5-325mg 5-325 mg per tablet   Commonly known as:  NORCO   Quantity:  20 tablet   Refills:  0   Dose:  1 tablet    Instructions:  Take 1 tablet by mouth  every 6 (six) hours as needed for Pain.     Begin Date    AM    Noon    PM    Bedtime         CONTINUE taking these medications        Additional Info                      allopurinol 100 MG tablet   Commonly known as:  ZYLOPRIM   Refills:  1      Begin Date    AM    Noon    PM    Bedtime       atorvastatin 40 MG tablet   Commonly known as:  LIPITOR   Refills:  1      Begin Date    AM    Noon    PM    Bedtime       carvedilol 6.25 MG tablet   Commonly known as:  COREG   Refills:  2      Begin Date    AM    Noon    PM    Bedtime       hydrALAZINE 25 MG tablet   Commonly known as:  APRESOLINE   Refills:  2      Begin Date    AM    Noon    PM    Bedtime       pioglitazone 45 MG tablet   Commonly known as:  ACTOS   Refills:  3      Begin Date    AM    Noon    PM    Bedtime            Where to Get Your Medications      Information about where to get these medications is not yet available     ! Ask your nurse or doctor about these medications     hydrocodone-acetaminophen 5-325mg 5-325 mg per tablet                  Please bring to all follow up appointments:    1. A copy of your discharge instructions.  2. All medicines you are currently taking in their original bottles.  3. Identification and insurance card.    Please arrive 15 minutes ahead of scheduled appointment time.    Please call 24 hours in advance if you must reschedule your appointment and/or time.        Follow-up Information     Follow up with Abel Kim MD In 1 week.    Specialties:  General Surgery, Surgery    Contact information:    [address]  [address]  [facility] [address] [phone]          Discharge Instructions     Future Orders    Activity as tolerated     Call MD for:  persistent nausea and vomiting     Call MD for:  redness, tenderness, or signs of infection (pain, swelling, redness, odor or green/yellow discharge around incision site)     Call MD for:  severe uncontrolled pain     Call MD for:  temperature >100.4     Diet  general     Questions:    Total calories:      Fat restriction, if any:      Protein restriction, if any:      Na restriction, if any:      Fluid restriction:      Additional restrictions:      Leave dressing on - Keep it clean, dry, and intact until clinic visit     Lifting restrictions         Discharge Instructions         Arteriovenous (AV) Fistula for Dialysis  An AV fistula is a connection between an artery and a vein. For this procedure, an AV fistula is surgically created using an artery and a vein in your arm. (Your doctor will let you know if another site is to be used.) When the artery and vein are joined, blood flow increases from the artery into the vein. As a result, the vein enlarges over time. The enlarged vein provides easier access to the blood for dialysis (a treatment for kidney failure). This sheet explains the procedure and what to expect.       An AV fistula increases blood flow from the artery into the vein. Over time, the vein becomes stronger and enlarged.     Preparing for the Procedure  Prepare as you have been told. In addition:  · Tell your doctor about all medications you take. This includes over-the-counter drugs. It also includes herbs and other supplements. You may need to stop taking some or all of them before the procedure.  · Follow any directions youre given for not eating or drinking before the procedure.  · Do not allow anyone to draw blood from or take blood pressure on the arm that will have the fistula prior to the procedure.  ·   The Day of the Procedure  The procedure takes about 1-2 hours. Youll likely go home the same day.  Before the procedure begins:  · An IV line is put into a vein in the arm or hand not being used for the procedure. This line supplies fluids and medications.  · To keep you free of pain during the procedure, youre given general anesthesia. This medication puts you into a state like deep sleep through the procedure. Or a nerve block may be used.  This medication numbs the arm. With it, you may also be given medication that makes you relaxed and drowsy through the procedure.  ·   During the procedure:  · The skin over your arm may be injected with numbing medication.  · One or more small incisions are then made through the numbed skin. This depends on the size of your arm and the depth of the vein in your arm.  · The vein is attached to the selected artery.  · Any incisions made are then closed with sutures (stitches), staples, surgical glue, or strips of surgical tape.  ·   After the procedure:  · Youll be asked to keep your arm raised (elevated) as often as possible for at least a week after the procedure.  · Youll be given medications to manage pain as needed.  · Your arm and hand will be checked to make sure blood is flowing through the fistula properly. The feeling of blood rushing through the fistula is called a thrill. It is somewhat similar to the purring of a cat. Youll be taught how to check for this feeling each day to make sure there are no problems with your fistula. Youll also be taught how to care for your fistula at home.  · When its time for you to leave the hospital, have an adult family member or friend ready to drive you home.  ·   Recovering at Home  Once at home, follow all of the instructions youve been given. Be sure to:  · Take all medications as directed.  · Care for your incision as instructed.  · Check for signs of infection at the incision site (see below).  · Avoid heavy lifting and strenuous activities as directed.  · Monitor and care for your fistula as instructed.  · Do your hand and arm exercises as instructed. This usually involves squeezing a ball in your hand for a few minutes each hour.  ·   Call Your Health Care Provider If You Have Any of the Following:  · Fever of 100.4°F or higher  · Signs of infection at the incision site, such as increased redness or swelling, warmth, worsening pain, bleeding, or foul-smelling  drainage  · Lack of a thrill (you cant feel it)  · Pain or numbness in your fingers, hand, or arm  · Bleeding, redness, or warmth around your fistula  · Sudden bulging of the fistula (more than usual; a slight bulge is normal)   Follow-Up  Your health care provider will check your fistula within 1 to 2 weeks after the procedure. It will likely take about 6 to 8 weeks for the fistula to enlarge enough to start dialysis. After that, make sure the fistula is checked each time you have dialysis. Your health care provider may also suggest checkups every 6 months.  Risks and Possible Complications Include:  · Failure of the fistula to work properly  · Long wait before the fistula is ready (up to 6 months)  · Coldness or numbness in the hand (due to blood flowing away from the hand and into the fistula)  · An unsightly bump under the skin (due to enlargement of the fistula)  · Prolonged bleeding from the fistula after dialysis  · Narrowing or weakening of the blood vessels used for the fistula  · Formation of blood clots in the blood vessels used for the fistula  · Risks of anesthesia or any other medications used during the procedure       Living with an AV Fistula  A problem, such as narrowing of the vein (stricture) or infection, can make the fistula unusable. If this happens, you may need other treatments to repair or make a new fistula. To protect your fistula, follow these and any other guidelines youre given:  · Check your fistula as often as your health care provider says. If you cant feel your thrill, let he or she know right away.  · Make sure your fistula is checked before each dialysis treatment.  · Dont let anyone draw blood from or take blood pressure on the arm that has the fistula.  · Wash your hands often and keep the area around your fistula clean.  · Dont sleep on the arm that has the fistula.  · Dont wear tight jewelry or a watch on the arm with your fistula.  · Protect your fistula from cuts,  "scrapes, or blows.    Notify Dr. Kim for any problems or concerns.  May remove the dressing in 2 days. Then apply band aids to site until completed dry and starting to scab.            Primary Diagnosis     Your primary diagnosis was:  Chronic Kidney Failure      Admission Information     Date & Time Provider Department CSN    5/4/2017  8:12 AM Abel Kim MD [facility] [id number]      Care Providers     Provider Role Specialty Primary office phone    Abel Kim MD Attending Provider General Surgery [phone]    Randee Perez MD Consulting Physician  Anesthesiology [phone]    Abel Kim MD Surgeon  General Surgery [phone]      Your Vitals Were     BP Pulse Temp Resp Height Weight    133/63 (BP Location: Right arm, Patient Position: Lying, BP Method: Automatic) 67 97.7 °F (36.5 °C) (Oral) 18 5' 2" (1.575 m) 64 kg (141 lb)    SpO2 BMI             95% 25.79 kg/m2         Recent Lab Values     No lab values to display.      Allergies as of 5/4/2017     No Known Allergies      [facility]     [facility] Nurse Care Line - 24/7 Assistance  Unless otherwise directed by your provider, please contact [facility], our nurse care line that is available for 24/7 assistance.     Registered nurses in the [facility] provide clinical advisement, health education, appointment booking, and other advisory services.  Call for this free service at [phone].        Advance Directives     An advance directive is a document which, in the event you are no longer able to make decisions for yourself, tells your healthcare team what kind of treatment you do or do not want to receive, or who you would like to make those decisions for you.  If you do not currently have an advance directive, [facility] encourages you to create one.  For more information call:  [phone]), [phone] ([phone]),  or log on to " [email or web address].        Smoking Cessation     If you would like to quit smoking:   You may be eligible for free services if you are a [place] resident and started smoking cigarettes before September 1, 1988.  Call the Smoking Cessation Trust (SCT) toll free at (838) 293-1715 or (706) 094-9789.   Call 1-800-QUIT-NOW if you do not meet the above criteria.   Contact us via email: [email or web address]   View our website for more information: [email or web address]        Language Assistance Services     ATTENTION: Language assistance services are available, free of charge. Please call [phone].      ATENCIÓN: Si habla esplexus, tiene a swartz disposición servicios gratuitos de asistencia lingüística. Llame al [phone].     CHÚ Ý: N?u b?n nói Ti?ng Vi?t, có các d?ch v? h? tr? ngôn ng? mi?n phí dành cho b?n. G?i s? [phone].        Chronic Kindey Disease Education             Diabetes Discharge Instructions                                   [facility] Sign-Up     Activating your [facility] account is as easy as 1-2-3!     1) Visit [email or web address], select Sign Up Now, enter this activation code and your date of birth, then select Next.  [email or web address]  Expires: 5/28/2017  3:20 PM      2) Create a username and password to use when you visit [facility] in the future and select a security question in case you lose your password and select Next.    3) Enter your e-mail address and click Sign Up!    Additional Information  If you have questions, please e-mail [email or web address] or call [phone] to talk to our [facility] staff. Remember, [facility] is NOT to be used for urgent needs. For medical emergencies, dial 911.          [facility] complies with applicable Federal civil rights laws and does not discriminate on the basis of race, color, national origin, age, disability, or sex.

## 2017-05-04 NOTE — PLAN OF CARE
Patient sitting up on side of the bed, spouse and daughter in law at bedside. Patient AAOX3, VSS. Patient is a good historian and is supported by his family care. Patient speaks and understands english and does not require an . Patient does not need valuables locked up with security.Patients EUNICE Roque at bedside, [phone]. Oriented the patient and family to the room and pre procedure protocol. Verbalized understanding.

## 2017-05-04 NOTE — H&P
"History of Present Illness:  Patient is a 89 y.o. male presents with           Chief Complaint   Patient presents with    Hemodialysis Access       pt needs perm access         Review of patient's allergies indicates:  No Known Allergies      Current Medications           Current Outpatient Prescriptions   Medication Sig Dispense Refill    allopurinol (ZYLOPRIM) 100 MG tablet     1    atorvastatin (LIPITOR) 40 MG tablet     1    calcitRIOL (ROCALTROL) 0.25 MCG Cap     0    carvedilol (COREG) 6.25 MG tablet     2    COREG CR 10 mg CM24     2    furosemide (LASIX) 40 MG tablet     2    glimepiride (AMARYL) 1 MG tablet     1    hydrALAZINE (APRESOLINE) 25 MG tablet     2    pioglitazone (ACTOS) 45 MG tablet     3      No current facility-administered medications for this visit.                  Past Medical History:   Diagnosis Date    Diabetes mellitus      Hyperlipidemia      Hypertension      Kidney failure      Macular degeneration        History reviewed. No pertinent surgical history.  History reviewed. No pertinent family history.       Social History   Substance Use Topics    Smoking status: Never Smoker    Smokeless tobacco: None    Alcohol use No         Review of Systems:     Review of Systems   Constitutional: Negative.   HENT: Negative.   Eyes: Negative.   Respiratory: Negative.   Cardiovascular: Negative.   Gastrointestinal: Negative.   Endocrine: Negative.   Genitourinary: Negative.   Musculoskeletal: Negative.   Skin: Negative.   Allergic/Immunologic: Negative.   Neurological: Negative.   Hematological: Negative.   Psychiatric/Behavioral: Negative.         OBJECTIVE:      Vital Signs (Most Recent)  Pulse: 61 (04/13/17 1435)  Resp: 17 (04/13/17 1435)  BP: (!) 171/69 (04/13/17 1435)  5' 4" (1.626 m)  64.9 kg (143 lb)      Physical Exam:     Physical Exam   Constitutional: He is oriented to person, place, and time. He appears well-developed and well-nourished.   HENT:   Head: " Normocephalic.   Eyes: Pupils are equal, round, and reactive to light.   Neck: Normal range of motion.   Cardiovascular: Normal rate, regular rhythm, normal heart sounds and intact distal pulses. Exam reveals no gallop and no friction rub.   No murmur heard.  Pulmonary/Chest: Effort normal and breath sounds normal. No respiratory distress. He has no wheezes. He has no rales. He exhibits no tenderness.   Abdominal: Soft. Bowel sounds are normal. He exhibits no distension and no mass. There is no tenderness. No hernia.   Musculoskeletal: Normal range of motion.   Neurological: He is alert and oriented to person, place, and time.   Skin: Skin is warm.         Laboratory        Diagnostic Results:        ASSESSMENT/PLAN:      crf  Dm  htn  cad     PLAN:Plan   Vein mapping, vascular access placement today left arm

## 2017-09-05 PROBLEM — M79.89 SWELLING IN LEFT ARMPIT: Status: ACTIVE | Noted: 2017-09-05

## 2018-01-04 ENCOUNTER — HOSPITAL ENCOUNTER (OUTPATIENT)
Dept: RADIOLOGY | Facility: HOSPITAL | Age: 83
Discharge: HOME | End: 2018-01-04
Attending: SURGERY
Payer: MEDICARE

## 2018-01-04 DIAGNOSIS — N18.4 TYPE 2 DIABETES MELLITUS WITH STAGE 4 CHRONIC KIDNEY DISEASE, WITH LONG-TERM CURRENT USE OF INSULIN: ICD-10-CM

## 2018-01-04 DIAGNOSIS — N18.30 CRF (CHRONIC RENAL FAILURE), STAGE 3 (MODERATE): ICD-10-CM

## 2018-01-04 DIAGNOSIS — N18.4 CHRONIC RENAL FAILURE, STAGE 4 (SEVERE): ICD-10-CM

## 2018-01-04 DIAGNOSIS — E11.22 TYPE 2 DIABETES MELLITUS WITH STAGE 4 CHRONIC KIDNEY DISEASE, WITH LONG-TERM CURRENT USE OF INSULIN: ICD-10-CM

## 2018-01-04 DIAGNOSIS — T85.698A MALFUNCTION OF DEVICE, INITIAL ENCOUNTER: ICD-10-CM

## 2018-01-04 DIAGNOSIS — L60.0 INGROWING NAIL, LEFT GREAT TOE: ICD-10-CM

## 2018-01-04 DIAGNOSIS — Z79.4 TYPE 2 DIABETES MELLITUS WITH STAGE 4 CHRONIC KIDNEY DISEASE, WITH LONG-TERM CURRENT USE OF INSULIN: ICD-10-CM

## 2018-01-04 PROCEDURE — 93990 DOPPLER FLOW TESTING: CPT | Mod: TC

## 2018-01-04 PROCEDURE — 93990 DOPPLER FLOW TESTING: CPT | Mod: 26,,, | Performed by: RADIOLOGY

## 2018-02-22 PROBLEM — G89.29 CHRONIC LOW BACK PAIN WITHOUT SCIATICA: Status: ACTIVE | Noted: 2018-02-22

## 2018-02-22 PROBLEM — M54.50 CHRONIC LOW BACK PAIN WITHOUT SCIATICA: Status: ACTIVE | Noted: 2018-02-22

## 2018-03-13 ENCOUNTER — OFFICE VISIT (OUTPATIENT)
Dept: PRIMARY CARE CLINIC | Facility: CLINIC | Age: 83
End: 2018-03-13
Payer: MEDICARE

## 2018-03-13 VITALS
SYSTOLIC BLOOD PRESSURE: 159 MMHG | HEART RATE: 64 BPM | WEIGHT: 141 LBS | RESPIRATION RATE: 18 BRPM | HEIGHT: 64 IN | OXYGEN SATURATION: 98 % | BODY MASS INDEX: 24.07 KG/M2 | DIASTOLIC BLOOD PRESSURE: 64 MMHG

## 2018-03-13 DIAGNOSIS — F32.A DEPRESSION, UNSPECIFIED DEPRESSION TYPE: ICD-10-CM

## 2018-03-13 DIAGNOSIS — E11.22 TYPE 2 DIABETES MELLITUS WITH STAGE 4 CHRONIC KIDNEY DISEASE, WITH LONG-TERM CURRENT USE OF INSULIN: ICD-10-CM

## 2018-03-13 DIAGNOSIS — M54.50 CHRONIC MIDLINE LOW BACK PAIN WITHOUT SCIATICA: ICD-10-CM

## 2018-03-13 DIAGNOSIS — N18.4 CHRONIC RENAL FAILURE, STAGE 4 (SEVERE): ICD-10-CM

## 2018-03-13 DIAGNOSIS — N18.4 TYPE 2 DIABETES MELLITUS WITH STAGE 4 CHRONIC KIDNEY DISEASE, WITH LONG-TERM CURRENT USE OF INSULIN: ICD-10-CM

## 2018-03-13 DIAGNOSIS — R01.1 HEART MURMUR: ICD-10-CM

## 2018-03-13 DIAGNOSIS — F41.9 ANXIETY: ICD-10-CM

## 2018-03-13 DIAGNOSIS — I10 ESSENTIAL HYPERTENSION: Primary | ICD-10-CM

## 2018-03-13 DIAGNOSIS — G89.29 CHRONIC MIDLINE LOW BACK PAIN WITHOUT SCIATICA: ICD-10-CM

## 2018-03-13 DIAGNOSIS — M10.9 GOUT, UNSPECIFIED CAUSE, UNSPECIFIED CHRONICITY, UNSPECIFIED SITE: ICD-10-CM

## 2018-03-13 DIAGNOSIS — K08.109 EDENTULOUS: ICD-10-CM

## 2018-03-13 DIAGNOSIS — Z79.4 TYPE 2 DIABETES MELLITUS WITH STAGE 4 CHRONIC KIDNEY DISEASE, WITH LONG-TERM CURRENT USE OF INSULIN: ICD-10-CM

## 2018-03-13 DIAGNOSIS — K59.00 CONSTIPATION, UNSPECIFIED CONSTIPATION TYPE: ICD-10-CM

## 2018-03-13 PROCEDURE — 99213 OFFICE O/P EST LOW 20 MIN: CPT | Mod: PBBFAC,PN | Performed by: FAMILY MEDICINE

## 2018-03-13 PROCEDURE — 99999 PR PBB SHADOW E&M-EST. PATIENT-LVL III: CPT | Mod: PBBFAC,,, | Performed by: FAMILY MEDICINE

## 2018-03-13 PROCEDURE — 99203 OFFICE O/P NEW LOW 30 MIN: CPT | Mod: S$PBB,,, | Performed by: FAMILY MEDICINE

## 2018-03-13 NOTE — PROGRESS NOTES
Subjective:       Patient ID: Ab Aldrich is a 89 y.o. male.    Chief Complaint: Establish Care    HPI: 89-year-old white male in for new PCP.  Always tired.  Eating well.  January 26th--got weak and fell--compression fracture of one of the lumbar vertebra.  Given pain medication since some constipation.  Walks with a walker.  End-stage renal disease has dialysis Monday Wednesday Friday    ROS:  Skin: no psoriasis, eczema, skin cancer  HEENT: No headache, ocular pain, blurred vision, diplopia, epistaxis, hoarseness change in voice, thyroid trouble  Lung: No pneumonia, asthma, Tb, wheezing, SOB, no smoking  Heart: No chest pain, ankle edema, palpitations, MI, lavelle murmur,+ hypertension,+ hyperlipidemia no CHF no arrhythmias no CAD  Abdomen: No nausea, vomiting, diarrhea, constipation, ulcers, hepatitis, gallbladder disease, melena, hematochezia, hematemesis  : no UTI, renal disease, stones--+ ESRD   MS: no fractures, O/A, lupus, rheumatoid, +gout--history of back problems since compression fracture lumbar spine--  Neuro: No dizziness, LOC, seizures   + diabetes--glucose usually in the 200's,Hx  no anemia, + anxiety, + depression   , 2 children, lives with son, patient was  ×30 years    Objective:   Physical Exam:  General: Well nourished, well developed, no acute distress  Skin: No lesions  HEENT: Eyes PERRLA, EOM intact, irregular pupils cataract surgery nose patent, throat non-erythematous + edentulous ears TM clear decreased hearing  NECK: Supple, no bruits, No JVD, no nodes  Lungs: Clear, no rales, rhonchi, wheezing decreased breath sounds  Heart: Regular rate and rhythm, no gallops, or rubs + heart murmur  Abdomen: flat, bowel sounds positive, no tenderness, or organomegaly  MS: Range of motion and muscle strength intact--does ambulate with a walker in clinic in a wheelchair  Neuro: Alert, CN intact, oriented X 3  Extremities: No cyanosis, clubbing, or edema         Assessment:        1. Essential hypertension    2. Chronic renal failure, stage 4 (severe)    3. Type 2 diabetes mellitus with stage 4 chronic kidney disease, with long-term current use of insulin    4. Chronic midline low back pain without sciatica    5. Anxiety    6. Depression, unspecified depression type    7. Gout, unspecified cause, unspecified chronicity, unspecified site    8. Edentulous    9. Constipation, unspecified constipation type    10. Heart murmur        Plan:       Essential hypertension  -     CBC auto differential; Future; Expected date: 2018  -     Comprehensive metabolic panel; Future; Expected date: 2018  -     EKG 12-lead; Future  -     Lipid panel; Future; Expected date: 2018  -     X-Ray Chest PA And Lateral; Future; Expected date: 2018  -     Urinalysis  -     T4, free; Future; Expected date: 2018  -     TSH; Future; Expected date: 2018    Chronic renal failure, stage 4 (severe)    Type 2 diabetes mellitus with stage 4 chronic kidney disease, with long-term current use of insulin  -     Hemoglobin A1c; Future; Expected date: 2018    Chronic midline low back pain without sciatica    Anxiety    Depression, unspecified depression type    Gout, unspecified cause, unspecified chronicity, unspecified site    Edentulous    Constipation, unspecified constipation type    Heart murmur  -     Transthoracic echo (TTE) complete; Future      90 day supply of all medications with 3 refills  Patient fasting do lab today  Has heart murmur daughter states not sure if had no past will get echocardiogram  Continue dialysis  for end-stage renal disease  Patient with some depression wife  2017 may benefit from going to  on aging  Patient with diabetes runs in the 200s be sure on 1800-calorie low-sodium ADA renal diet--do Accu-Cheks for 1 month show them to me and we'll adjust medications according  Needs to see Gina larkin is for  glucometer glucose strips lancets

## 2018-03-22 NOTE — TELEPHONE ENCOUNTER
----- Message from Cm Galanpartha sent at 3/19/2018  2:26 PM CDT -----  Contact: Dtr in Law/Mya Roque called in regarding the attached patient (father in law) and stated that Dr. Akins was supposed to call in a glucose meter (entire kit) for patient to start testing at home?    [id number] - [place], [place] - [address]  [address] [address]  Phone: [phone] Fax: [phone]    Mya's call back number is [phone]

## 2018-03-23 ENCOUNTER — TELEPHONE (OUTPATIENT)
Dept: PRIMARY CARE CLINIC | Facility: CLINIC | Age: 83
End: 2018-03-23

## 2018-03-23 RX ORDER — INSULIN PUMP SYRINGE, 3 ML
EACH MISCELLANEOUS
Qty: 1 EACH | Refills: 0 | Status: SHIPPED | OUTPATIENT
Start: 2018-03-23 | End: 2019-12-17

## 2018-03-23 NOTE — TELEPHONE ENCOUNTER
----- Message from Cassidy Saucedo sent at 3/23/2018  4:03 PM CDT -----  Contact: [phone] Mya Perdivina (Daughter in law)  [phone] Mya Perdivina (Daughter in law) please call in regards to the test scripts.

## 2018-03-25 PROBLEM — I35.0 AORTIC STENOSIS, MODERATE: Status: ACTIVE | Noted: 2018-03-25

## 2018-03-25 PROBLEM — I27.20 PULMONARY HYPERTENSION: Status: ACTIVE | Noted: 2018-03-25

## 2018-03-29 ENCOUNTER — TELEPHONE (OUTPATIENT)
Dept: PRIMARY CARE CLINIC | Facility: CLINIC | Age: 83
End: 2018-03-29

## 2018-03-29 NOTE — TELEPHONE ENCOUNTER
----- Message from Anne Miller sent at 3/29/2018  3:58 PM CDT -----  Luz Maria with [facility] stated patient has laceration on left forearm from hitting wall/needs to know if should treat it/currently with patient/please call back at [phone] to advise.

## 2018-03-29 NOTE — TELEPHONE ENCOUNTER
Called stephanie from  states patient has large laceration on arm that patient has on arm and was d/c patient today but doesn't want to leave him with out care with wound. States will go back and see patient twice a week then once a week until sees us on 10th

## 2018-04-10 ENCOUNTER — OFFICE VISIT (OUTPATIENT)
Dept: PRIMARY CARE CLINIC | Facility: CLINIC | Age: 83
End: 2018-04-10
Payer: MEDICARE

## 2018-04-10 VITALS
DIASTOLIC BLOOD PRESSURE: 55 MMHG | HEART RATE: 61 BPM | OXYGEN SATURATION: 96 % | WEIGHT: 141 LBS | BODY MASS INDEX: 24.98 KG/M2 | SYSTOLIC BLOOD PRESSURE: 155 MMHG | HEIGHT: 63 IN | RESPIRATION RATE: 18 BRPM

## 2018-04-10 DIAGNOSIS — R01.1 HEART MURMUR: ICD-10-CM

## 2018-04-10 DIAGNOSIS — I10 ESSENTIAL HYPERTENSION: ICD-10-CM

## 2018-04-10 DIAGNOSIS — I35.0 AORTIC STENOSIS, MODERATE: Primary | ICD-10-CM

## 2018-04-10 DIAGNOSIS — N18.9 ANEMIA IN CHRONIC KIDNEY DISEASE, UNSPECIFIED CKD STAGE: ICD-10-CM

## 2018-04-10 DIAGNOSIS — I27.20 PULMONARY HYPERTENSION: ICD-10-CM

## 2018-04-10 DIAGNOSIS — F32.A DEPRESSION, UNSPECIFIED DEPRESSION TYPE: ICD-10-CM

## 2018-04-10 DIAGNOSIS — M54.50 CHRONIC MIDLINE LOW BACK PAIN WITHOUT SCIATICA: ICD-10-CM

## 2018-04-10 DIAGNOSIS — Z79.4 TYPE 2 DIABETES MELLITUS WITH STAGE 4 CHRONIC KIDNEY DISEASE, WITH LONG-TERM CURRENT USE OF INSULIN: ICD-10-CM

## 2018-04-10 DIAGNOSIS — G47.30 SLEEP APNEA, UNSPECIFIED TYPE: ICD-10-CM

## 2018-04-10 DIAGNOSIS — N18.4 TYPE 2 DIABETES MELLITUS WITH STAGE 4 CHRONIC KIDNEY DISEASE, WITH LONG-TERM CURRENT USE OF INSULIN: ICD-10-CM

## 2018-04-10 DIAGNOSIS — R06.83 SNORING: ICD-10-CM

## 2018-04-10 DIAGNOSIS — E11.22 TYPE 2 DIABETES MELLITUS WITH STAGE 4 CHRONIC KIDNEY DISEASE, WITH LONG-TERM CURRENT USE OF INSULIN: ICD-10-CM

## 2018-04-10 DIAGNOSIS — G89.29 CHRONIC MIDLINE LOW BACK PAIN WITHOUT SCIATICA: ICD-10-CM

## 2018-04-10 DIAGNOSIS — D63.1 ANEMIA IN CHRONIC KIDNEY DISEASE, UNSPECIFIED CKD STAGE: ICD-10-CM

## 2018-04-10 DIAGNOSIS — F41.9 ANXIETY: ICD-10-CM

## 2018-04-10 DIAGNOSIS — N18.6 END STAGE RENAL DISEASE: ICD-10-CM

## 2018-04-10 PROCEDURE — 99999 PR PBB SHADOW E&M-EST. PATIENT-LVL IV: CPT | Mod: PBBFAC,,, | Performed by: FAMILY MEDICINE

## 2018-04-10 PROCEDURE — 99214 OFFICE O/P EST MOD 30 MIN: CPT | Mod: PBBFAC,PN | Performed by: FAMILY MEDICINE

## 2018-04-10 PROCEDURE — 99213 OFFICE O/P EST LOW 20 MIN: CPT | Mod: S$PBB,,, | Performed by: FAMILY MEDICINE

## 2018-04-10 RX ORDER — ALLOPURINOL 100 MG/1
100 TABLET ORAL 2 TIMES DAILY
Qty: 60 TABLET | Refills: 5 | Status: SHIPPED | OUTPATIENT
Start: 2018-04-10 | End: 2018-09-24 | Stop reason: SDUPTHER

## 2018-04-10 RX ORDER — PIOGLITAZONEHYDROCHLORIDE 45 MG/1
45 TABLET ORAL DAILY
Qty: 90 TABLET | Refills: 1 | Status: SHIPPED | OUTPATIENT
Start: 2018-04-10 | End: 2019-03-20 | Stop reason: SDUPTHER

## 2018-04-10 RX ORDER — CARVEDILOL 6.25 MG/1
6.25 TABLET ORAL 2 TIMES DAILY
Qty: 180 TABLET | Refills: 1 | Status: SHIPPED | OUTPATIENT
Start: 2018-04-10 | End: 2018-10-10 | Stop reason: SDUPTHER

## 2018-04-10 RX ORDER — ATORVASTATIN CALCIUM 40 MG/1
40 TABLET, FILM COATED ORAL NIGHTLY
Qty: 90 TABLET | Refills: 1 | Status: SHIPPED | OUTPATIENT
Start: 2018-04-10 | End: 2018-12-20 | Stop reason: SDUPTHER

## 2018-04-10 RX ORDER — CALCIUM ACETATE 667 MG/1
667 CAPSULE ORAL 2 TIMES DAILY
Qty: 180 CAPSULE | Refills: 1 | Status: SHIPPED | OUTPATIENT
Start: 2018-04-10 | End: 2018-10-02 | Stop reason: SDUPTHER

## 2018-04-10 RX ORDER — HYDRALAZINE HYDROCHLORIDE 25 MG/1
25 TABLET, FILM COATED ORAL EVERY 12 HOURS
Qty: 180 TABLET | Refills: 1 | Status: SHIPPED | OUTPATIENT
Start: 2018-04-10 | End: 2018-06-19

## 2018-04-10 RX ORDER — TRAMADOL HYDROCHLORIDE 50 MG/1
50 TABLET ORAL EVERY 6 HOURS PRN
Qty: 30 TABLET | Refills: 0 | Status: SHIPPED | OUTPATIENT
Start: 2018-04-10 | End: 2018-05-10

## 2018-04-10 RX ORDER — LIDOCAINE 50 MG/G
PATCH TOPICAL
Refills: 1 | COMMUNITY
Start: 2018-03-08 | End: 2018-05-10

## 2018-04-10 NOTE — PROGRESS NOTES
Subjective:       Patient ID: Ab Aldrich is a 89 y.o. male.    Chief Complaint: Results    HPI:  89-year-old male in for results hematocrit 34.8 glucose 204 better if 100 120 BUN 27 creatinine 3.1 glomerular filtration rate 16-19 on dialysis Monday Wednesday and Friday chest x-ray cardiomegaly is echocardiogram mild to moderate aortic stenosis mild tricuspid regurgitation ejection fraction mild to moderate aortic stenosis       Patient states feels okay, eating well, + BM, up in wheelchair walks with a walker--uses wheelchair for long distances    ROS:  Skin: no psoriasis, eczema, skin cancer  HEENT: No headache, ocular pain, blurred vision, diplopia, epistaxis, hoarseness change in voice, thyroid trouble  Lung: No pneumonia, asthma, Tb, wheezing, SOB,--history of snoring a lot and occasional apneic episodes while sleeping worried about obstructive sleep apnea  Heart: No chest pain, ankle edema, palpitations, MI, lavelle murmur, +hypertension,no hyperlipidemia+ mild to moderate aortic stenosis, + pulmonary hypertension + heart murmur   Abdomen: No nausea, vomiting, diarrhea, constipation, ulcers, hepatitis, gallbladder disease, melena, hematochezia, hematemesis  : no UTI, renal disease, stones + end-stage renal disease dialysis Monday Wednesday Friday  MS: no fractures, O/A, lupus, rheumatoid, gout some arthritis history of gout history of back pain  Neuro: No dizziness, LOC, seizures   + diabetes--patient went to get glucometer and the glucose strips are not covered, no anemia, +anxiety, + depression -states feels much better since back better eating better ambulating more  Lives with his son.    Objective:   Physical Exam:  General: Well nourished, well developed, no acute distress + overweight  Skin: No lesions  HEENT: Eyes PERRLA, EOM intact, nose patent, throat non-erythematous ears TM clear  NECK: Supple, no bruits, No JVD, no nodes  Lungs: Clear, no rales, rhonchi, wheezing  Heart: Regular rate  and rhythm, gallops, or rubs + heart murmur  Abdomen: flat, bowel sounds positive, no tenderness, or organomegaly  MS: Range of motion and muscle strength intact  Neuro: Alert, CN intact, oriented X 3  Extremities: No cyanosis, clubbing, or edema         Assessment:       1. Aortic stenosis, moderate    2. Pulmonary hypertension    3. Essential hypertension    4. Heart murmur    5. Type 2 diabetes mellitus with stage 4 chronic kidney disease, with long-term current use of insulin    6. End stage renal disease    7. Chronic midline low back pain without sciatica    8. Anxiety    9. Depression, unspecified depression type    10. Anemia in chronic kidney disease, unspecified CKD stage    11. Snoring    12. Sleep apnea, unspecified type        Plan:       Aortic stenosis, moderate  -     Ambulatory referral to Cardiology    Pulmonary hypertension  -     Ambulatory referral to Cardiology    Essential hypertension    Heart murmur  -     Ambulatory referral to Cardiology    Type 2 diabetes mellitus with stage 4 chronic kidney disease, with long-term current use of insulin  -     Ambulatory Referral to Nutrition Services    End stage renal disease    Chronic midline low back pain without sciatica    Anxiety    Depression, unspecified depression type    Anemia in chronic kidney disease, unspecified CKD stage    Snoring  -     Polysomnography 4 or more parameters; Future    Sleep apnea, unspecified type  -     Polysomnography 4 or more parameters; Future    Other orders  -     allopurinol (ZYLOPRIM) 100 MG tablet; Take 1 tablet (100 mg total) by mouth 2 (two) times daily.  Dispense: 60 tablet; Refill: 5  -     atorvastatin (LIPITOR) 40 MG tablet; Take 1 tablet (40 mg total) by mouth every evening.  Dispense: 90 tablet; Refill: 1  -     calcium acetate (PHOSLO) 667 mg capsule; Take 1 capsule (667 mg total) by mouth 2 (two) times daily.  Dispense: 180 capsule; Refill: 1  -     carvedilol (COREG) 6.25 MG tablet; Take 1 tablet  (6.25 mg total) by mouth 2 (two) times daily.  Dispense: 180 tablet; Refill: 1  -     hydrALAZINE (APRESOLINE) 25 MG tablet; Take 1 tablet (25 mg total) by mouth every 12 (twelve) hours.  Dispense: 180 tablet; Refill: 1  -     pioglitazone (ACTOS) 45 MG tablet; Take 1 tablet (45 mg total) by mouth once daily.  Dispense: 90 tablet; Refill: 1  -     traMADol (ULTRAM) 50 mg tablet; Take 1 tablet (50 mg total) by mouth every 6 (six) hours as needed.  Dispense: 30 tablet; Refill: 0      appointment with Dr. Castillo for cardiac evaluation due to hypertension/aortic stenosis/pulmonary hypertension/heart murmur  End-stage renal disease continue dialysis Monday Wednesday Friday  Appointment with Gina larkin is 1800-calorie ADA low-sodium renal diet patient also needs a glucometer that is covered by his insurance company with lancets glucose strips may need to be on renal dose Accu-Chek sliding scale  Return in 3 months with Accu-Cheks see needs to be placed on insulin

## 2018-04-12 ENCOUNTER — TELEPHONE (OUTPATIENT)
Dept: SLEEP MEDICINE | Facility: OTHER | Age: 83
End: 2018-04-12

## 2018-04-19 ENCOUNTER — TELEPHONE (OUTPATIENT)
Dept: SLEEP MEDICINE | Facility: OTHER | Age: 83
End: 2018-04-19

## 2018-05-03 ENCOUNTER — TELEPHONE (OUTPATIENT)
Dept: SLEEP MEDICINE | Facility: OTHER | Age: 83
End: 2018-05-03

## 2018-05-08 ENCOUNTER — TELEPHONE (OUTPATIENT)
Dept: PRIMARY CARE CLINIC | Facility: CLINIC | Age: 83
End: 2018-05-08

## 2018-05-08 NOTE — TELEPHONE ENCOUNTER
----- Message from Lisa Luciano sent at 5/8/2018 10:08 AM CDT -----  Contact: Shamika with [facility] phone [phone] ext [phone] fax [phone]  Shamika with [facility] phone [phone] ext [phone] fax [phone], Calling because they are missing the 485 form, they received the Research Story but not the 485. Please advise. Thanks.

## 2018-05-10 ENCOUNTER — OFFICE VISIT (OUTPATIENT)
Dept: CARDIOLOGY | Facility: CLINIC | Age: 83
End: 2018-05-10
Payer: MEDICARE

## 2018-05-10 VITALS
SYSTOLIC BLOOD PRESSURE: 137 MMHG | HEART RATE: 65 BPM | WEIGHT: 140 LBS | BODY MASS INDEX: 24.8 KG/M2 | HEIGHT: 63 IN | DIASTOLIC BLOOD PRESSURE: 51 MMHG

## 2018-05-10 DIAGNOSIS — I27.20 PULMONARY HYPERTENSION: ICD-10-CM

## 2018-05-10 DIAGNOSIS — N18.4 TYPE 2 DIABETES MELLITUS WITH STAGE 4 CHRONIC KIDNEY DISEASE, WITH LONG-TERM CURRENT USE OF INSULIN: ICD-10-CM

## 2018-05-10 DIAGNOSIS — I35.0 AORTIC STENOSIS: ICD-10-CM

## 2018-05-10 DIAGNOSIS — I35.0 NONRHEUMATIC AORTIC VALVE STENOSIS: ICD-10-CM

## 2018-05-10 DIAGNOSIS — I35.0 AORTIC STENOSIS, MODERATE: Primary | ICD-10-CM

## 2018-05-10 DIAGNOSIS — N18.6 END STAGE RENAL DISEASE: ICD-10-CM

## 2018-05-10 DIAGNOSIS — E11.22 TYPE 2 DIABETES MELLITUS WITH STAGE 4 CHRONIC KIDNEY DISEASE, WITH LONG-TERM CURRENT USE OF INSULIN: ICD-10-CM

## 2018-05-10 DIAGNOSIS — I10 ESSENTIAL HYPERTENSION: ICD-10-CM

## 2018-05-10 DIAGNOSIS — Z79.4 TYPE 2 DIABETES MELLITUS WITH STAGE 4 CHRONIC KIDNEY DISEASE, WITH LONG-TERM CURRENT USE OF INSULIN: ICD-10-CM

## 2018-05-10 PROCEDURE — 99213 OFFICE O/P EST LOW 20 MIN: CPT | Mod: PBBFAC,PN | Performed by: INTERNAL MEDICINE

## 2018-05-10 PROCEDURE — 99999 PR PBB SHADOW E&M-EST. PATIENT-LVL III: CPT | Mod: PBBFAC,,, | Performed by: INTERNAL MEDICINE

## 2018-05-10 PROCEDURE — 99204 OFFICE O/P NEW MOD 45 MIN: CPT | Mod: S$PBB,,, | Performed by: INTERNAL MEDICINE

## 2018-05-10 NOTE — PROGRESS NOTES
Subjective:      Patient ID: Ab Aldrich is a 89 y.o. male.    Chief Complaint: Heart Murmur (Ref by Tashi)    HPI:  Pt referred by Dr Akins for evaluation of aortic stenosis.    Pt is on dialysis for ESRD on MWF utilizing a right sublclavian port.  Pt is only able to walk about 20 feet slowly.   Pt fell several months ago and fractured a vertebrae.  Pt is mostly limited by weakness of both legs.    Review of Systems   Cardiovascular: Negative for chest pain, claudication, dyspnea on exertion, irregular heartbeat, leg swelling, near-syncope, orthopnea, palpitations and syncope.      Appetite is good      Past Medical History:   Diagnosis Date    Cancer     skin     Diabetes mellitus     ESRD on dialysis     Hyperlipidemia     Hypertension     Macular degeneration         Past Surgical History:   Procedure Laterality Date    CENTRAL VENOUS CATHETER TUNNELED INSERTION DOUBLE LUMEN Right 02/2017    DIALYSIS FISTULA CREATION      M, W, F       Family History   Problem Relation Age of Onset    Heart attack Mother     Heart attack Father        Social History     Social History    Marital status:      Spouse name: N/A    Number of children: N/A    Years of education: N/A     Social History Main Topics    Smoking status: Former Smoker     Quit date: 5/10/1988    Smokeless tobacco: Never Used    Alcohol use No    Drug use: No    Sexual activity: Not Currently     Other Topics Concern    None     Social History Narrative    None       Current Outpatient Prescriptions on File Prior to Visit   Medication Sig Dispense Refill    allopurinol (ZYLOPRIM) 100 MG tablet Take 1 tablet (100 mg total) by mouth 2 (two) times daily. 60 tablet 5    atorvastatin (LIPITOR) 40 MG tablet Take 1 tablet (40 mg total) by mouth every evening. 90 tablet 1    calcium acetate (PHOSLO) 667 mg capsule Take 1 capsule (667 mg total) by mouth 2 (two) times daily. (Patient taking differently: Take 667 mg by  "mouth 3 (three) times daily. ) 180 capsule 1    carvedilol (COREG) 6.25 MG tablet Take 1 tablet (6.25 mg total) by mouth 2 (two) times daily. 180 tablet 1    hydrALAZINE (APRESOLINE) 25 MG tablet Take 1 tablet (25 mg total) by mouth every 12 (twelve) hours. 180 tablet 1    MULTIVIT-MIN/FA/LYCOPEN/LUTEIN (CENTRUM SILVER MEN ORAL) Take by mouth.      pioglitazone (ACTOS) 45 MG tablet Take 1 tablet (45 mg total) by mouth once daily. 90 tablet 1    blood sugar diagnostic (TRUE METRIX GLUCOSE TEST STRIP) Strp 1 strip by Misc.(Non-Drug; Combo Route) route once daily. 100 strip 5    blood-glucose meter kit Use as instructed 1 each 0    lancets 33 gauge Misc 1 lancet by Misc.(Non-Drug; Combo Route) route once daily. 100 each 5    [DISCONTINUED] lidocaine (LIDODERM) 5 % TAKE 1 UNIT(S) TO AFFECTED AREA ONCE A DAY AS NEEDED [PRN]  1    [DISCONTINUED] traMADol (ULTRAM) 50 mg tablet Take 1 tablet (50 mg total) by mouth every 6 (six) hours as needed. 30 tablet 0     No current facility-administered medications on file prior to visit.        Review of patient's allergies indicates:  No Known Allergies  Objective:     Vitals:    05/10/18 0945   BP: (!) 137/51   BP Location: Right arm   Patient Position: Sitting   BP Method: Medium (Automatic)   Pulse: 65   Weight: 63.5 kg (140 lb)   Height: 5' 3" (1.6 m)        Physical Exam   Constitutional: He is oriented to person, place, and time. He appears well-developed and well-nourished. No distress.   Eyes: No scleral icterus.   Neck: JVD (mild) present. Carotid bruit is not present.   Cardiovascular: Regular rhythm.  Exam reveals no gallop and no friction rub.    Murmur (IV/VI systolic) heard.  Pulmonary/Chest: Effort normal. No respiratory distress. He has rales in the left lower field.   Abdominal: Soft. There is no tenderness.   Musculoskeletal: He exhibits edema (trivial LLE).   Neurological: He is alert and oriented to person, place, and time.   Skin: Skin is warm and dry. " He is not diaphoretic.   Psychiatric: He has a normal mood and affect. His behavior is normal. Judgment and thought content normal.   Vitals reviewed.   Pt is in a wheelchair      Recent echocardiogram showed normal LVEF and moderate aortic stenosis and mild pulmonary hypertension    ECG-NSR with first degree AV block and PVCs's, LVH, abnormal R-wave progression in precordial leads.  Assessment:     1. Aortic stenosis, moderate    2. Essential hypertension    3. End stage renal disease    4. Type 2 diabetes mellitus with stage 4 chronic kidney disease, with long-term current use of insulin    5. Aortic stenosis    6. Pulmonary hypertension      Plan:   Ab was seen today for heart murmur.    Diagnoses and all orders for this visit:    Aortic stenosis, moderate  -     IN OFFICE EKG 12-LEAD (to Muse)  -     Transthoracic echo (TTE) complete; Future    Essential hypertension    End stage renal disease    Type 2 diabetes mellitus with stage 4 chronic kidney disease, with long-term current use of insulin    Aortic stenosis  -     IN OFFICE EKG 12-LEAD (to Muse)    Pulmonary hypertension       Reviewed symptoms of progressive aortic stenosis with pt and son--shortness of breath, chest pain and fainting  RTC one year with repeat echocardiogram  Agree with management  Follow-up in about 1 year (around 5/10/2019).

## 2018-05-10 NOTE — LETTER
May 10, 2018      Maciej Akins MD  [address] [place] [address]           [facility]  [address]  [place] [address]  Phone: [phone]  Fax: [phone]          Patient: Ab Aldrich   MR Number: [id number]   YOB: 1927   Date of Visit: 5/10/2018       Dear Dr. Maciej Akins:    Thank you for referring Ab Aldrich to me for evaluation. Attached you will find relevant portions of my assessment and plan of care.    If you have questions, please do not hesitate to call me. I look forward to following Ab Aldrich along with you.    Sincerely,    Aldo Ramey MD    Enclosure  CC:  No Recipients    If you would like to receive this communication electronically, please contact [email or web address] or [phone] to request more information on [employer] access.    For providers and/or their staff who would like to refer a patient to [facility], please contact us through our one-stop-shop provider referral line, [facility], at [phone].    If you feel you have received this communication in error or would no longer like to receive these types of communications, please e-mail [email or web address]

## 2018-05-18 ENCOUNTER — TELEPHONE (OUTPATIENT)
Dept: PRIMARY CARE CLINIC | Facility: CLINIC | Age: 83
End: 2018-05-18

## 2018-05-18 NOTE — TELEPHONE ENCOUNTER
----- Message from Alejandra Acosta sent at 5/18/2018  8:26 AM CDT -----  Contact: Bere Blackburn from [facility] called regarding the orders she dropped off at the office last week. Wanted to verify if received and completed. Please contact [phone]

## 2018-05-22 ENCOUNTER — TELEPHONE (OUTPATIENT)
Dept: CARDIOLOGY | Facility: CLINIC | Age: 83
End: 2018-05-22

## 2018-05-22 NOTE — TELEPHONE ENCOUNTER
I spoke with pt's son.   When pt goes to dialysis pt feels weak and his blood pressure is low. The hydralazine was discontinued.   BP improved after discontinuing the hydralazine.  If BP goes low again pt will need to hold the carvedilol as well.

## 2018-06-19 ENCOUNTER — OFFICE VISIT (OUTPATIENT)
Dept: PRIMARY CARE CLINIC | Facility: CLINIC | Age: 83
End: 2018-06-19
Payer: MEDICARE

## 2018-06-19 VITALS
RESPIRATION RATE: 18 BRPM | HEIGHT: 63 IN | DIASTOLIC BLOOD PRESSURE: 77 MMHG | HEART RATE: 57 BPM | WEIGHT: 140 LBS | OXYGEN SATURATION: 97 % | SYSTOLIC BLOOD PRESSURE: 158 MMHG | BODY MASS INDEX: 24.8 KG/M2

## 2018-06-19 DIAGNOSIS — E11.22 TYPE 2 DIABETES MELLITUS WITH STAGE 4 CHRONIC KIDNEY DISEASE, WITH LONG-TERM CURRENT USE OF INSULIN: ICD-10-CM

## 2018-06-19 DIAGNOSIS — Z99.2 ANEMIA IN CHRONIC KIDNEY DISEASE, ON CHRONIC DIALYSIS: ICD-10-CM

## 2018-06-19 DIAGNOSIS — R01.1 HEART MURMUR: ICD-10-CM

## 2018-06-19 DIAGNOSIS — G47.30 SLEEP APNEA, UNSPECIFIED TYPE: ICD-10-CM

## 2018-06-19 DIAGNOSIS — K08.109 EDENTULOUS: ICD-10-CM

## 2018-06-19 DIAGNOSIS — I27.20 PULMONARY HYPERTENSION: ICD-10-CM

## 2018-06-19 DIAGNOSIS — D63.1 ANEMIA IN CHRONIC KIDNEY DISEASE, ON CHRONIC DIALYSIS: ICD-10-CM

## 2018-06-19 DIAGNOSIS — N18.4 CHRONIC RENAL FAILURE, STAGE 4 (SEVERE): ICD-10-CM

## 2018-06-19 DIAGNOSIS — N18.4 TYPE 2 DIABETES MELLITUS WITH STAGE 4 CHRONIC KIDNEY DISEASE, WITH LONG-TERM CURRENT USE OF INSULIN: ICD-10-CM

## 2018-06-19 DIAGNOSIS — I35.0 NONRHEUMATIC AORTIC VALVE STENOSIS: ICD-10-CM

## 2018-06-19 DIAGNOSIS — Z79.4 TYPE 2 DIABETES MELLITUS WITH STAGE 4 CHRONIC KIDNEY DISEASE, WITH LONG-TERM CURRENT USE OF INSULIN: ICD-10-CM

## 2018-06-19 DIAGNOSIS — I10 ESSENTIAL HYPERTENSION: ICD-10-CM

## 2018-06-19 DIAGNOSIS — N18.6 END STAGE RENAL DISEASE: Primary | ICD-10-CM

## 2018-06-19 DIAGNOSIS — N18.6 ANEMIA IN CHRONIC KIDNEY DISEASE, ON CHRONIC DIALYSIS: ICD-10-CM

## 2018-06-19 PROCEDURE — 99213 OFFICE O/P EST LOW 20 MIN: CPT | Mod: S$PBB,,, | Performed by: FAMILY MEDICINE

## 2018-06-19 PROCEDURE — 99213 OFFICE O/P EST LOW 20 MIN: CPT | Mod: PBBFAC,PN | Performed by: FAMILY MEDICINE

## 2018-06-19 PROCEDURE — 99999 PR PBB SHADOW E&M-EST. PATIENT-LVL III: CPT | Mod: PBBFAC,,, | Performed by: FAMILY MEDICINE

## 2018-06-19 RX ORDER — HYDRALAZINE HYDROCHLORIDE 10 MG/1
10 TABLET, FILM COATED ORAL 3 TIMES DAILY
Qty: 90 TABLET | Refills: 1 | Status: ON HOLD | OUTPATIENT
Start: 2018-06-19 | End: 2018-07-19 | Stop reason: HOSPADM

## 2018-06-19 NOTE — PROGRESS NOTES
Subjective:       Patient ID: Ab Aldrich is a 89 y.o. male.    Chief Complaint: Follow-up (3 month follow up BP problems )    HPI: 89-year-old white male in for blood pressure being labile--- patient on dialysis Monday Wednesday Friday.  Was having very low blood pressures so was taken off of hydralazine.  Blood pressure did well for a few days but after her last dialysis was 190 restarted on hydralazine 25mg .  Blood pressure at home with arm cuff is in the 200-225 systolic range--but at dialysis and here more in the 120-158 gr systolic always below 90 diastolic       No headaches and no appendectomy staxis no dizziness passing out seizures no chest pain occasional ankle edema.    ROS:  Skin: no psoriasis, eczema, skin cancer  HEENT: No headache, ocular pain, blurred vision, diplopia, epistaxis, hoarseness change in voice, thyroid trouble  Lung: No pneumonia, asthma, Tb, wheezing, SOB, history obstructive sleep apnea and does not use CPAP machine and is sleeping well  Heart: No chest pain, +ankle edema,no  palpitations, MI, lavelle murmur,+ hypertension,+ hyperlipidemia + heart murmur--history of GERD 9 rheumatological aortic stenosis seen by cardiologist told nothing to worry about at this point + pulmonary hypertension  Abdomen: No nausea, vomiting, diarrhea, +constipation--going with lactulose, ulcers, hepatitis, gallbladder disease, melena, hematochezia, hematemesis  : no UTI, renal disease, stones + chronic renal insufficiency/end-stage renal disease  MS: no fractures, O/A, lupus, rheumatoid,+ gout  Neuro: No dizziness, LOC, seizures   + diabetes, no anemia, + anxiety, + depression     Objective:   Physical Exam:  General: Well nourished, well developed, no acute distress + obese  Skin: No lesions  HEENT: Eyes PERRLA, EOM intact, nose patent, throat non-erythematous   NECK: Supple, no bruits, No JVD, no nodes  Lungs: Clear, no rales, rhonchi, wheezing  Heart: Regular rate and rhythm, no murmurs,  gallops, or rubs  Abdomen: flat, bowel sounds positive, no tenderness, or organomegaly  MS: Range of motion and muscle strength intact  Neuro: Alert, CN intact, oriented X 3  Extremities: No cyanosis, clubbing, + edema         Assessment:       1. End stage renal disease    2. Chronic renal failure, stage 4 (severe)    3. Nonrheumatic aortic valve stenosis    4. Essential hypertension    5. Edentulous    6. Pulmonary hypertension    7. Heart murmur    8. Anemia in chronic kidney disease, on chronic dialysis    9. Type 2 diabetes mellitus with stage 4 chronic kidney disease, with long-term current use of insulin    10. Sleep apnea, unspecified type        Plan:       End stage renal disease    Chronic renal failure, stage 4 (severe)    Nonrheumatic aortic valve stenosis    Essential hypertension    Edentulous    Pulmonary hypertension    Heart murmur    Anemia in chronic kidney disease, on chronic dialysis    Type 2 diabetes mellitus with stage 4 chronic kidney disease, with long-term current use of insulin    Sleep apnea, unspecified type      dizziness continue dialysis Monday Wednesday Friday  Start doing Accu-Chek's before breakfast only  Hemoglobin A1c was 5.67 glucose is well controlled  Blood pressure somewhat labile due to the dialysis patient restarted on hydralazine-25 mg and blood pressure been running low will decrease to 10 mg   Note to  for physical therapy if not to soon

## 2018-06-22 ENCOUNTER — TELEPHONE (OUTPATIENT)
Dept: PRIMARY CARE CLINIC | Facility: CLINIC | Age: 83
End: 2018-06-22

## 2018-06-22 NOTE — TELEPHONE ENCOUNTER
----- Message from Cat Hudson sent at 6/22/2018 11:33 AM CDT -----  Contact: Tonio  Patient's son is calling regarding home health orders that was to be faxed to [facility]. States just called and no orders are there as was told the orders need to come from office. Please fax to [phone]. Thanks!

## 2018-06-22 NOTE — TELEPHONE ENCOUNTER
----- Message from Felisa Altman sent at 6/22/2018 11:58 AM CDT -----  Contact: Ami with [facility]   Ami with [facility] [phone] ext [phone] calling because she needs a demographics sheet and last office visit notes faxed to [phone]. Please advise. Thanks.

## 2018-06-25 ENCOUNTER — TELEPHONE (OUTPATIENT)
Dept: PRIMARY CARE CLINIC | Facility: CLINIC | Age: 83
End: 2018-06-25

## 2018-06-25 NOTE — TELEPHONE ENCOUNTER
----- Message from Anne Vidal sent at 6/25/2018  1:51 PM CDT -----  Type: Needs Medical Advice    Who Called:  Ami with [facility]  Symptoms (please be specific):  n/a  How long has patient had these symptoms:  n/a  Pharmacy name and phone #:  N/a  Best Call Back Number: [phone]  Additional Information: Ami received a referral she is requesting demographic page and las visit notes, fax to [phone]

## 2018-06-27 ENCOUNTER — TELEPHONE (OUTPATIENT)
Dept: PRIMARY CARE CLINIC | Facility: CLINIC | Age: 83
End: 2018-06-27

## 2018-06-27 NOTE — TELEPHONE ENCOUNTER
----- Message from Lisa Luciano sent at 6/27/2018  2:53 PM CDT -----  Contact: Nida with [facility] phone [phone] ext [phone] fax [phone]  Nida with [facility] phone [phone] ext [phone] fax [phone], Calling for an order for an Home Health Aid two times a week for four weeks. Please advise. Thanks.

## 2018-07-02 ENCOUNTER — TELEPHONE (OUTPATIENT)
Dept: PRIMARY CARE CLINIC | Facility: CLINIC | Age: 83
End: 2018-07-02

## 2018-07-02 NOTE — TELEPHONE ENCOUNTER
[facility] out of office for week states faxing papers over to kandis wright

## 2018-07-02 NOTE — TELEPHONE ENCOUNTER
[facility] is out of office for week states faxing papers over to kandis wright

## 2018-07-02 NOTE — TELEPHONE ENCOUNTER
----- Message from Cm Barragan sent at 7/2/2018 11:05 AM CDT -----  Contact: Nida/[facility]  Nida called in and wanted to check the status of an order for a Home Health Aide for the attached patient.  Nida stated she spoke to office on 6/27/18 but had not heard back.    Nida's call back number is [phone], ext [phone]

## 2018-07-12 NOTE — TELEPHONE ENCOUNTER
----- Message from Lisa Luciano sent at 7/12/2018  2:48 PM CDT -----  Contact: Nida with [employer] phone [phone] ext [phone]  Nida with [employer] phone [phone] ext [phone], Calling to tell you that his systolic is 192 to 197 through out the day even though he is taking all of his blood pressure medications. Please advise. Thanks.

## 2018-07-12 NOTE — TELEPHONE ENCOUNTER
Spoke with Nida at  and she reports that Dr Akins decreased Hydralazine because his BP was running too low. She reports that during dialysis his BP maintains 130's systolic, but after it goes up. Nida reports BP's have been in 190's systolic today. Pt does not c/o any issues. Nurse would like to know if Hydralazine needs to be increased again?

## 2018-07-12 NOTE — TELEPHONE ENCOUNTER
Spoke with Dr Akins and he said for pt to take Hydralazine 25mg bid on days pt does not get dialysis and continue hydralazine 10mg tid on days pt does get dialysis and f/u in 3 weeks. Pended Rx to Dr Akins to sign. Spoke with Nida ,  nurse, and she states she  will call pts son and discuss medications with him.

## 2018-07-15 RX ORDER — HYDRALAZINE HYDROCHLORIDE 25 MG/1
25 TABLET, FILM COATED ORAL EVERY 12 HOURS
Qty: 48 TABLET | Refills: 5 | Status: SHIPPED | OUTPATIENT
Start: 2018-07-15 | End: 2019-03-28 | Stop reason: SDUPTHER

## 2018-07-17 ENCOUNTER — TELEPHONE (OUTPATIENT)
Dept: PRIMARY CARE CLINIC | Facility: CLINIC | Age: 83
End: 2018-07-17

## 2018-07-17 PROBLEM — R53.1 WEAKNESS: Status: ACTIVE | Noted: 2018-07-17

## 2018-07-17 PROBLEM — J18.9 CAP (COMMUNITY ACQUIRED PNEUMONIA): Status: ACTIVE | Noted: 2018-07-17

## 2018-07-17 PROBLEM — N39.0 URINARY TRACT INFECTION WITHOUT HEMATURIA: Status: ACTIVE | Noted: 2018-07-17

## 2018-07-17 NOTE — TELEPHONE ENCOUNTER
----- Message from Lisa Luciano sent at 7/17/2018  9:33 AM CDT -----  Contact: Ton with [facility] phone [phone]  Type: Needs Medical Advice    Who Called:  Ton with [facility]  Symptoms (please be specific):  Strong urine odor, fever 100.1, sudden onset weakness  How long has patient had these symptoms:  A couple of days  Pharmacy name and phone #:    [id number] [place] - [address]  [address]  [place] [address]  Phone: [phone] Fax: [phone]  Best Call Back Number: [phone]  Additional Information: They think he may have a urinary tract infection. Please advise. Thanks.

## 2018-07-17 NOTE — TELEPHONE ENCOUNTER
Called and spoke with  nurse Ton. Notified him per nurse that patient needs to go to the ER due to his fever and symptoms. States he will have the family bring him to the ER. Nothing discussed further.

## 2018-07-18 PROBLEM — R54 FRAILTY SYNDROME IN GERIATRIC PATIENT: Status: ACTIVE | Noted: 2018-07-18

## 2018-07-20 ENCOUNTER — TELEPHONE (OUTPATIENT)
Dept: PRIMARY CARE CLINIC | Facility: CLINIC | Age: 83
End: 2018-07-20

## 2018-07-20 NOTE — TELEPHONE ENCOUNTER
----- Message from Cassidy Saucedo sent at 7/20/2018 12:44 PM CDT -----  Contact: jamey with [facility] ph#[phone] ext [phone]  jamey with [facility] ph#[phone] ext [phone]  Requesting home health skill nurse eval and treat, home health aide 2x a week for 4 wks (to assist w/ bathing).  Fax [phone]

## 2018-07-20 NOTE — TELEPHONE ENCOUNTER
Spoke with Nida at . States patient just got out of the hospital for pneumonia and uti. Hospital wrote orders to resume PT/OT but now they nida needs orders to continue PT/OT, skilled nursing, and HH to assist in bathing x2 weekly for 4 weeks. Gave verbal for all orders above to Nida. States she will write up the orders and have the pt started. Nothing discussed further.

## 2018-07-23 DIAGNOSIS — N39.0 URINARY TRACT INFECTION WITHOUT HEMATURIA, SITE UNSPECIFIED: ICD-10-CM

## 2018-07-23 RX ORDER — CEFDINIR 300 MG/1
300 CAPSULE ORAL DAILY
Qty: 10 CAPSULE | Refills: 0 | Status: SHIPPED | OUTPATIENT
Start: 2018-07-23 | End: 2018-07-26

## 2018-07-23 NOTE — TELEPHONE ENCOUNTER
----- Message from Lisa Angulo sent at 7/23/2018  8:43 AM CDT -----  Pt's son states that the er Dr / did not call in (or give a copy) prescription for cefdinir (OMNICEF) 300 MG capsule  / call son / Toino [phone] asking to have done this morning      [id number] - [place] [place] - [address]  [address] [place] [address]  Phone: [phone] Fax: [phone]

## 2018-07-25 ENCOUNTER — TELEPHONE (OUTPATIENT)
Dept: PRIMARY CARE CLINIC | Facility: CLINIC | Age: 83
End: 2018-07-25

## 2018-07-25 NOTE — TELEPHONE ENCOUNTER
----- Message from Cassidy Saucedo sent at 7/25/2018  9:11 AM CDT -----  Contact: jamey payan/  with [facility] ph#[phone] ext [phone]   jamey payan/  with [facility] ph#[phone] ext [phone]   Need to speak to nurse to see if patient received his 2nd antibiotic omnicef

## 2018-07-26 ENCOUNTER — TELEPHONE (OUTPATIENT)
Dept: PRIMARY CARE CLINIC | Facility: CLINIC | Age: 83
End: 2018-07-26

## 2018-07-26 ENCOUNTER — OFFICE VISIT (OUTPATIENT)
Dept: PRIMARY CARE CLINIC | Facility: CLINIC | Age: 83
End: 2018-07-26
Payer: MEDICARE

## 2018-07-26 VITALS
BODY MASS INDEX: 23.9 KG/M2 | WEIGHT: 140 LBS | RESPIRATION RATE: 18 BRPM | HEIGHT: 64 IN | DIASTOLIC BLOOD PRESSURE: 65 MMHG | HEART RATE: 61 BPM | OXYGEN SATURATION: 95 % | TEMPERATURE: 98 F | SYSTOLIC BLOOD PRESSURE: 150 MMHG

## 2018-07-26 DIAGNOSIS — J18.9 COMMUNITY ACQUIRED PNEUMONIA, UNSPECIFIED LATERALITY: ICD-10-CM

## 2018-07-26 DIAGNOSIS — N30.00 ACUTE CYSTITIS WITHOUT HEMATURIA: Primary | ICD-10-CM

## 2018-07-26 DIAGNOSIS — R19.7 DIARRHEA, UNSPECIFIED TYPE: ICD-10-CM

## 2018-07-26 PROCEDURE — 99213 OFFICE O/P EST LOW 20 MIN: CPT | Mod: PBBFAC,PN | Performed by: INTERNAL MEDICINE

## 2018-07-26 PROCEDURE — 99999 PR PBB SHADOW E&M-EST. PATIENT-LVL III: CPT | Mod: PBBFAC,,, | Performed by: INTERNAL MEDICINE

## 2018-07-26 PROCEDURE — 99213 OFFICE O/P EST LOW 20 MIN: CPT | Mod: S$PBB,,, | Performed by: INTERNAL MEDICINE

## 2018-07-26 PROCEDURE — 81002 URINALYSIS NONAUTO W/O SCOPE: CPT | Mod: PBBFAC,PN | Performed by: INTERNAL MEDICINE

## 2018-07-26 RX ORDER — AMOXICILLIN AND CLAVULANATE POTASSIUM 875; 125 MG/1; MG/1
1 TABLET, FILM COATED ORAL 2 TIMES DAILY
COMMUNITY
End: 2018-07-26 | Stop reason: SINTOL

## 2018-07-26 RX ORDER — CEFDINIR 300 MG/1
300 CAPSULE ORAL 2 TIMES DAILY
Qty: 20 CAPSULE | Refills: 0 | Status: SHIPPED | OUTPATIENT
Start: 2018-07-26 | End: 2018-08-05

## 2018-07-26 NOTE — TELEPHONE ENCOUNTER
----- Message from Nixon López sent at 7/26/2018  3:45 PM CDT -----    Type:  Patient Returning Call    Who Called:  Nida/[facility]   Who Left Message for Patient:  Monica  Does the patient know what this is regarding?:  Patient's antibiotics  Best Call Back Number:  [phone] ext. [phone]

## 2018-07-27 NOTE — PROGRESS NOTES
Subjective:       Patient ID: Ab Aldrich Sr. is a 89 y.o. male.    Chief Complaint: Hospital Follow Up    HPI  Pt was in hospital 2 weeks ago for UTI pneumonia in hospital x 4 days then released coughing pneumonia rsolved so as UTI no further symptoms   Review of Systems    Objective:      Physical Exam   Constitutional: He is oriented to person, place, and time. He appears well-developed and well-nourished. No distress.   Wheel chair bounced   HENT:   Head: Normocephalic and atraumatic.   Right Ear: External ear normal.   Left Ear: External ear normal.   Nose: Nose normal.   Mouth/Throat: Oropharynx is clear and moist. No oropharyngeal exudate.   Eyes: Conjunctivae and EOM are normal. Pupils are equal, round, and reactive to light. Right eye exhibits no discharge. Left eye exhibits no discharge.   Neck: Normal range of motion. Neck supple. No thyromegaly present.   Cardiovascular: Normal rate, regular rhythm, normal heart sounds and intact distal pulses.  Exam reveals no gallop and no friction rub.    No murmur heard.  Pulmonary/Chest: Effort normal and breath sounds normal. No respiratory distress. He has no wheezes. He has no rales. He exhibits no tenderness.   Abdominal: Soft. Bowel sounds are normal. He exhibits no distension. There is no tenderness. There is no rebound and no guarding.   Musculoskeletal: Normal range of motion. He exhibits no edema, tenderness or deformity.   Lymphadenopathy:     He has no cervical adenopathy.   Neurological: He is alert and oriented to person, place, and time.   Skin: Skin is warm and dry. Capillary refill takes less than 2 seconds. No rash noted. No erythema.   Psychiatric: He has a normal mood and affect. Judgment and thought content normal.   Nursing note and vitals reviewed.      Assessment:       1. Acute cystitis without hematuria    2. Diarrhea, unspecified type    3. Community acquired pneumonia, unspecified laterality        Plan:       Acute cystitis  without hematuria  Comments:  will try get U/A if pt can go now or bring specimen later  Orders:  -     POCT URINE DIPSTICK WITHOUT MICROSCOPE  -     cefdinir (OMNICEF) 300 MG capsule; Take 1 capsule (300 mg total) by mouth 2 (two) times daily. for 10 days  Dispense: 20 capsule; Refill: 0    Diarrhea, unspecified type    Community acquired pneumonia, unspecified laterality  Comments:  resolved  Orders:  -     cefdinir (OMNICEF) 300 MG capsule; Take 1 capsule (300 mg total) by mouth 2 (two) times daily. for 10 days  Dispense: 20 capsule; Refill: 0

## 2018-07-30 ENCOUNTER — CLINICAL SUPPORT (OUTPATIENT)
Dept: PRIMARY CARE CLINIC | Facility: CLINIC | Age: 83
End: 2018-07-30
Payer: MEDICARE

## 2018-07-30 LAB
BILIRUB SERPL-MCNC: ABNORMAL MG/DL
BLOOD URINE, POC: ABNORMAL
COLOR, POC UA: YELLOW
GLUCOSE UR QL STRIP: 50
KETONES UR QL STRIP: ABNORMAL
LEUKOCYTE ESTERASE URINE, POC: ABNORMAL
NITRITE, POC UA: ABNORMAL
PH, POC UA: 5
PROTEIN, POC: 100
SPECIFIC GRAVITY, POC UA: 1.01
UROBILINOGEN, POC UA: NORMAL

## 2018-08-14 RX ORDER — HYDRALAZINE HYDROCHLORIDE 10 MG/1
TABLET, FILM COATED ORAL
Qty: 90 TABLET | Refills: 1 | Status: SHIPPED | OUTPATIENT
Start: 2018-08-14 | End: 2019-03-28

## 2018-09-04 ENCOUNTER — TELEPHONE (OUTPATIENT)
Dept: PRIMARY CARE CLINIC | Facility: CLINIC | Age: 83
End: 2018-09-04

## 2018-09-04 NOTE — TELEPHONE ENCOUNTER
----- Message from Nixon López sent at 9/4/2018  9:43 AM CDT -----  Type: Needs Medical Advice    Who Called:  Son/Tonio    Best Call Back Number: [phone]  Additional Information: Son calling.  States that the patient's ears seem to be clogged and wants to know if Dr. Akins could help with his condition.  Please call to advise

## 2018-09-10 ENCOUNTER — OFFICE VISIT (OUTPATIENT)
Dept: PRIMARY CARE CLINIC | Facility: CLINIC | Age: 83
End: 2018-09-10
Payer: MEDICARE

## 2018-09-10 VITALS
BODY MASS INDEX: 23.9 KG/M2 | HEART RATE: 53 BPM | HEIGHT: 64 IN | OXYGEN SATURATION: 97 % | SYSTOLIC BLOOD PRESSURE: 188 MMHG | WEIGHT: 140 LBS | DIASTOLIC BLOOD PRESSURE: 77 MMHG | RESPIRATION RATE: 18 BRPM

## 2018-09-10 DIAGNOSIS — G47.30 SLEEP APNEA, UNSPECIFIED TYPE: ICD-10-CM

## 2018-09-10 DIAGNOSIS — H91.90 DECREASED HEARING, UNSPECIFIED LATERALITY: Primary | ICD-10-CM

## 2018-09-10 DIAGNOSIS — R01.1 HEART MURMUR: ICD-10-CM

## 2018-09-10 DIAGNOSIS — N18.6 END STAGE RENAL DISEASE: ICD-10-CM

## 2018-09-10 DIAGNOSIS — K59.00 CONSTIPATION, UNSPECIFIED CONSTIPATION TYPE: ICD-10-CM

## 2018-09-10 DIAGNOSIS — E11.22 TYPE 2 DIABETES MELLITUS WITH STAGE 4 CHRONIC KIDNEY DISEASE, WITH LONG-TERM CURRENT USE OF INSULIN: ICD-10-CM

## 2018-09-10 DIAGNOSIS — F32.A DEPRESSION, UNSPECIFIED DEPRESSION TYPE: ICD-10-CM

## 2018-09-10 DIAGNOSIS — F41.9 ANXIETY: ICD-10-CM

## 2018-09-10 DIAGNOSIS — N18.4 TYPE 2 DIABETES MELLITUS WITH STAGE 4 CHRONIC KIDNEY DISEASE, WITH LONG-TERM CURRENT USE OF INSULIN: ICD-10-CM

## 2018-09-10 DIAGNOSIS — Z79.4 TYPE 2 DIABETES MELLITUS WITH STAGE 4 CHRONIC KIDNEY DISEASE, WITH LONG-TERM CURRENT USE OF INSULIN: ICD-10-CM

## 2018-09-10 DIAGNOSIS — I35.0 NONRHEUMATIC AORTIC VALVE STENOSIS: ICD-10-CM

## 2018-09-10 DIAGNOSIS — I10 ESSENTIAL HYPERTENSION: ICD-10-CM

## 2018-09-10 DIAGNOSIS — N18.6 ANEMIA IN CHRONIC KIDNEY DISEASE, ON CHRONIC DIALYSIS: ICD-10-CM

## 2018-09-10 DIAGNOSIS — K08.109 EDENTULOUS: ICD-10-CM

## 2018-09-10 DIAGNOSIS — D63.1 ANEMIA IN CHRONIC KIDNEY DISEASE, ON CHRONIC DIALYSIS: ICD-10-CM

## 2018-09-10 DIAGNOSIS — Z99.2 ANEMIA IN CHRONIC KIDNEY DISEASE, ON CHRONIC DIALYSIS: ICD-10-CM

## 2018-09-10 DIAGNOSIS — I27.20 PULMONARY HYPERTENSION: ICD-10-CM

## 2018-09-10 PROBLEM — T85.698A MALFUNCTION OF DEVICE: Status: RESOLVED | Noted: 2018-01-04 | Resolved: 2018-09-10

## 2018-09-10 PROCEDURE — 99213 OFFICE O/P EST LOW 20 MIN: CPT | Mod: S$PBB,,, | Performed by: FAMILY MEDICINE

## 2018-09-10 PROCEDURE — 99214 OFFICE O/P EST MOD 30 MIN: CPT | Mod: PBBFAC,PN | Performed by: FAMILY MEDICINE

## 2018-09-10 PROCEDURE — 99999 PR PBB SHADOW E&M-EST. PATIENT-LVL IV: CPT | Mod: PBBFAC,,, | Performed by: FAMILY MEDICINE

## 2018-09-10 NOTE — PROGRESS NOTES
Subjective:       Patient ID: Ab Aldrich Sr. is a 89 y.o. male.    Chief Complaint: Cerumen Impaction    HPI:  89-year-old white male in for ears to be checked states can hear---about 2 weeks.  Has a history of wax in his ear.  No fever runny nose sore throat or cough  Last time here patient had UTI in early pneumonia it was sent to the emergency room--admitted treated with IV antibiotics    ROS:  Skin: no psoriasis, eczema, skin cancer  HEENT: No headache, ocular pain, blurred vision, diplopia, epistaxis, hoarseness change in voice, thyroid trouble +edentulous  Lung: No pneumonia, asthma, Tb, wheezing, SOB, states when he was in the hospital last time had an early pneumonia okay now +sleep apnea but sleeping better now  Heart: No chest pain, ankle edema, palpitations, MI, lavelle murmur, +hypertension,+ hyperlipidemia, +heart murmur +pulmonary hypertension +aortic valve stenosis  Abdomen: No nausea, vomiting, diarrhea, constipation, ulcers, hepatitis, gallbladder disease, melena, hematochezia, hematemesis  : no UTI, renal disease, stones--end-stage renal disease on dialysis Monday Wednesday Friday  MS: no fractures, O/A, lupus, rheumatoid, gout up in wheelchair  Neuro: No dizziness, LOC, seizures   + borderline diabetes, + anemia--do renal disease, + anxiety, + depression     Objective:   Physical Exam:  General: Well nourished, well developed, no acute distress +overweight  Skin: No lesions  HEENT: Eyes PERRLA, EOM intact, nose patent, throat non-erythematous edentulous ears some wax in the left canal right canal appears clear decreased hearing bilaterally  NECK: Supple, no bruits, No JVD, no nodes  Lungs: Clear, no rales, rhonchi, wheezing decreased breath sounds bilateral  Heart: Regular rate and rhythm, + murmurs, no gallops, or rubs  Abdomen: flat, bowel sounds positive, no tenderness, or organomegaly  MS: Range of motion and muscle strength intact   Neuro: Alert, CN intact, oriented X  3  Extremities: No cyanosis, clubbing, or edema         Assessment:       1. Decreased hearing, unspecified laterality    2. Sleep apnea, unspecified type    3. End stage renal disease    4. Anemia in chronic kidney disease, on chronic dialysis    5. Type 2 diabetes mellitus with stage 4 chronic kidney disease, with long-term current use of insulin    6. Constipation, unspecified constipation type    7. Nonrheumatic aortic valve stenosis    8. Heart murmur    9. Essential hypertension    10. Pulmonary hypertension    11. Edentulous    12. Anxiety    13. Depression, unspecified depression type        Plan:       Decreased hearing, unspecified laterality    Sleep apnea, unspecified type    End stage renal disease    Anemia in chronic kidney disease, on chronic dialysis    Type 2 diabetes mellitus with stage 4 chronic kidney disease, with long-term current use of insulin    Constipation, unspecified constipation type    Nonrheumatic aortic valve stenosis    Heart murmur    Essential hypertension    Pulmonary hypertension    Edentulous    Anxiety    Depression, unspecified depression type      patient Main with decreased hearing does have some wax in the left ear canal right ear canal appears clear--probable hearing loss not related to a wax problem but will get  ENT evaluate for possible hearing aides  Patient gets dialysis Monday Wednesday Friday  In October should have CBCs CMP lipid hemoglobin A1c chest x-ray EKG due to recent hospitalization then every 6 months  No new treatment at this time

## 2018-09-24 RX ORDER — ALLOPURINOL 100 MG/1
100 TABLET ORAL 2 TIMES DAILY
Qty: 60 TABLET | Refills: 1 | Status: SHIPPED | OUTPATIENT
Start: 2018-09-24 | End: 2018-11-17 | Stop reason: SDUPTHER

## 2018-10-02 RX ORDER — CALCIUM ACETATE 667 MG/1
667 CAPSULE ORAL 2 TIMES DAILY
Qty: 180 CAPSULE | Refills: 1 | Status: SHIPPED | OUTPATIENT
Start: 2018-10-02 | End: 2019-03-29 | Stop reason: SDUPTHER

## 2018-10-10 RX ORDER — CARVEDILOL 6.25 MG/1
6.25 TABLET ORAL 2 TIMES DAILY
Qty: 180 TABLET | Refills: 1 | Status: SHIPPED | OUTPATIENT
Start: 2018-10-10 | End: 2019-03-28 | Stop reason: SDUPTHER

## 2018-10-15 ENCOUNTER — TELEPHONE (OUTPATIENT)
Dept: PRIMARY CARE CLINIC | Facility: CLINIC | Age: 83
End: 2018-10-15

## 2018-10-15 NOTE — TELEPHONE ENCOUNTER
----- Message from Cm Barragan sent at 10/15/2018  9:29 AM CDT -----  Contact: Any/Emile Agarwal called in and wanted to see if patient had his flu and pneumonia shot this year?  Any's call back number is [phone] ([phone])

## 2018-10-15 NOTE — TELEPHONE ENCOUNTER
Check through EC[facility] and Epic not documentation of vaccines, called dialysis and notified states understanding

## 2018-11-20 RX ORDER — ALLOPURINOL 100 MG/1
100 TABLET ORAL 2 TIMES DAILY
Qty: 60 TABLET | Refills: 1 | Status: SHIPPED | OUTPATIENT
Start: 2018-11-20 | End: 2019-01-15 | Stop reason: SDUPTHER

## 2018-12-06 ENCOUNTER — TELEPHONE (OUTPATIENT)
Dept: PRIMARY CARE CLINIC | Facility: CLINIC | Age: 83
End: 2018-12-06

## 2018-12-06 NOTE — TELEPHONE ENCOUNTER
----- Message from Lisa Luciano sent at 12/6/2018  2:35 PM CST -----  Contact: Romeo with [employer] phone [phone]  Romeo with [employer] phone [phone], Calling to inquire about forms regarding diabetic shoes that was faxed. Please call her. Thanks.

## 2018-12-21 RX ORDER — ATORVASTATIN CALCIUM 40 MG/1
40 TABLET, FILM COATED ORAL NIGHTLY
Qty: 90 TABLET | Refills: 1 | Status: SHIPPED | OUTPATIENT
Start: 2018-12-21 | End: 2019-03-28 | Stop reason: SDUPTHER

## 2019-01-15 RX ORDER — ALLOPURINOL 100 MG/1
TABLET ORAL
Qty: 60 TABLET | Refills: 1 | Status: SHIPPED | OUTPATIENT
Start: 2019-01-15 | End: 2019-03-20 | Stop reason: SDUPTHER

## 2019-03-11 RX ORDER — ALLOPURINOL 100 MG/1
TABLET ORAL
Qty: 60 TABLET | Refills: 1 | OUTPATIENT
Start: 2019-03-11

## 2019-03-12 NOTE — TELEPHONE ENCOUNTER
Call tell pt last lab July should have lab q 6 mo with DM-- Last lab July lab was due Jan -- Needs do lab CBC,CMP,lipid, HGbA1C , uric acid level -- if needs be seen right away come in fasting get lab , see me , get in computer, get additional refills

## 2019-03-20 NOTE — TELEPHONE ENCOUNTER
----- Message from Mony Suarezza sent at 3/20/2019 11:15 AM CDT -----  Type:  RX Refill Request    Who Called:  Joseph Elizalde   Refill or New Rx:  refill  RX Name and Strength:  allopurinol (ZYLOPRIM) 100 MG tablet [Pharmacy Med Name: ALLOPURINOL 100 MG TABLET] and pioglitazone (ACTOS) 45 MG tablet  How is the patient currently taking it? (ex. 1XDay):  1 twice daily and 1 daily  Is this a 30 day or 90 day RX:  30  Preferred Pharmacy with phone number:    [id number] [place] [place] [place] [facility] [address]  [address] [address]  Phone: [phone] Fax: [phone]  Local or Mail Order:  local  Ordering Provider:  Dr Tashi Wolff Call Back Number:  [phone]  Additional Information:  He has scheduled an appt. Thank you!

## 2019-03-23 RX ORDER — PIOGLITAZONEHYDROCHLORIDE 45 MG/1
45 TABLET ORAL DAILY
Qty: 90 TABLET | Refills: 1 | Status: SHIPPED | OUTPATIENT
Start: 2019-03-23 | End: 2019-03-28 | Stop reason: SDUPTHER

## 2019-03-23 RX ORDER — ALLOPURINOL 100 MG/1
100 TABLET ORAL 2 TIMES DAILY
Qty: 60 TABLET | Refills: 2 | Status: SHIPPED | OUTPATIENT
Start: 2019-03-23 | End: 2019-03-28 | Stop reason: SDUPTHER

## 2019-03-28 ENCOUNTER — OFFICE VISIT (OUTPATIENT)
Dept: PRIMARY CARE CLINIC | Facility: CLINIC | Age: 84
End: 2019-03-28
Payer: MEDICARE

## 2019-03-28 VITALS
HEART RATE: 50 BPM | OXYGEN SATURATION: 100 % | SYSTOLIC BLOOD PRESSURE: 129 MMHG | WEIGHT: 140 LBS | HEIGHT: 64 IN | BODY MASS INDEX: 23.9 KG/M2 | RESPIRATION RATE: 18 BRPM | DIASTOLIC BLOOD PRESSURE: 60 MMHG

## 2019-03-28 DIAGNOSIS — D63.1 ANEMIA IN CHRONIC KIDNEY DISEASE, ON CHRONIC DIALYSIS: ICD-10-CM

## 2019-03-28 DIAGNOSIS — R01.1 HEART MURMUR: ICD-10-CM

## 2019-03-28 DIAGNOSIS — N18.6 ANEMIA IN CHRONIC KIDNEY DISEASE, ON CHRONIC DIALYSIS: ICD-10-CM

## 2019-03-28 DIAGNOSIS — Z99.2 ANEMIA IN CHRONIC KIDNEY DISEASE, ON CHRONIC DIALYSIS: ICD-10-CM

## 2019-03-28 DIAGNOSIS — Z79.4 TYPE 2 DIABETES MELLITUS WITH STAGE 4 CHRONIC KIDNEY DISEASE, WITH LONG-TERM CURRENT USE OF INSULIN: ICD-10-CM

## 2019-03-28 DIAGNOSIS — I35.0 NONRHEUMATIC AORTIC VALVE STENOSIS: ICD-10-CM

## 2019-03-28 DIAGNOSIS — I10 ESSENTIAL HYPERTENSION: Primary | ICD-10-CM

## 2019-03-28 DIAGNOSIS — R54 FRAILTY SYNDROME IN GERIATRIC PATIENT: ICD-10-CM

## 2019-03-28 DIAGNOSIS — N18.4 TYPE 2 DIABETES MELLITUS WITH STAGE 4 CHRONIC KIDNEY DISEASE, WITH LONG-TERM CURRENT USE OF INSULIN: ICD-10-CM

## 2019-03-28 DIAGNOSIS — M10.9 GOUT, UNSPECIFIED CAUSE, UNSPECIFIED CHRONICITY, UNSPECIFIED SITE: ICD-10-CM

## 2019-03-28 DIAGNOSIS — E11.22 TYPE 2 DIABETES MELLITUS WITH STAGE 4 CHRONIC KIDNEY DISEASE, WITH LONG-TERM CURRENT USE OF INSULIN: ICD-10-CM

## 2019-03-28 DIAGNOSIS — N18.6 END STAGE RENAL DISEASE: ICD-10-CM

## 2019-03-28 PROBLEM — M79.89 SWELLING IN LEFT ARMPIT: Status: RESOLVED | Noted: 2017-09-05 | Resolved: 2019-03-28

## 2019-03-28 PROCEDURE — 99214 OFFICE O/P EST MOD 30 MIN: CPT | Mod: S$PBB,,, | Performed by: FAMILY MEDICINE

## 2019-03-28 PROCEDURE — 99999 PR PBB SHADOW E&M-EST. PATIENT-LVL III: ICD-10-PCS | Mod: PBBFAC,,, | Performed by: FAMILY MEDICINE

## 2019-03-28 PROCEDURE — 99214 PR OFFICE/OUTPT VISIT, EST, LEVL IV, 30-39 MIN: ICD-10-PCS | Mod: S$PBB,,, | Performed by: FAMILY MEDICINE

## 2019-03-28 PROCEDURE — 99213 OFFICE O/P EST LOW 20 MIN: CPT | Mod: PBBFAC,PN | Performed by: FAMILY MEDICINE

## 2019-03-28 PROCEDURE — 99999 PR PBB SHADOW E&M-EST. PATIENT-LVL III: CPT | Mod: PBBFAC,,, | Performed by: FAMILY MEDICINE

## 2019-03-28 RX ORDER — CARVEDILOL 6.25 MG/1
6.25 TABLET ORAL 2 TIMES DAILY
Qty: 180 TABLET | Refills: 1 | Status: SHIPPED | OUTPATIENT
Start: 2019-03-28 | End: 2019-09-19 | Stop reason: SDUPTHER

## 2019-03-28 RX ORDER — ALLOPURINOL 100 MG/1
100 TABLET ORAL 2 TIMES DAILY
Qty: 60 TABLET | Refills: 2 | Status: SHIPPED | OUTPATIENT
Start: 2019-03-28 | End: 2019-08-06 | Stop reason: SDUPTHER

## 2019-03-28 RX ORDER — PIOGLITAZONEHYDROCHLORIDE 45 MG/1
45 TABLET ORAL DAILY
Qty: 90 TABLET | Refills: 1 | Status: SHIPPED | OUTPATIENT
Start: 2019-03-28 | End: 2019-12-17 | Stop reason: SDUPTHER

## 2019-03-28 RX ORDER — ATORVASTATIN CALCIUM 40 MG/1
40 TABLET, FILM COATED ORAL NIGHTLY
Qty: 90 TABLET | Refills: 1 | Status: SHIPPED | OUTPATIENT
Start: 2019-03-28 | End: 2019-12-17 | Stop reason: SDUPTHER

## 2019-03-28 RX ORDER — HYDRALAZINE HYDROCHLORIDE 25 MG/1
25 TABLET, FILM COATED ORAL EVERY 12 HOURS
Qty: 48 TABLET | Refills: 5 | Status: SHIPPED | OUTPATIENT
Start: 2019-03-28 | End: 2019-12-17 | Stop reason: SDUPTHER

## 2019-03-28 NOTE — PROGRESS NOTES
Subjective:       Patient ID: Ab Aldrich Sr. is a 89 y.o. male.    Chief Complaint: Medication Refill    HPI:  89 yo WM in for refills and checkup--eating well, +BM, patient does ambulate uses a walker uses a wheelchair for long distances patient on dialysis due to end-stage renal disease Monday Wednesday Friday--but BP and diabetes doing well    ROS:  Skin: no psoriasis, eczema, skin cancer  HEENT: No headache, ocular pain, blurred vision, diplopia, epistaxis, hoarseness change in voice, thyroid trouble +edentulous  Lung: + hx  Pneumonia,no  asthma, Tb, wheezing, SOB, history sleep apnea  Heart: No chest pain, ankle edema, palpitations, MI, lavelle murmur, +hypertension,+ hyperlipidemia, +heart murmur +pulmonary hypertension +aortic valve stenosis no stent or bypass  Abdomen: No nausea, vomiting, diarrhea, constipation, ulcers, hepatitis, gallbladder disease, melena, hematochezia, hematemesis  : no UTI, renal disease, stones--end-stage renal disease on dialysis Monday Wednesday Friday  MS: no fractures, O/A, lupus, rheumatoid, gout up in wheelchair  Neuro: No dizziness, LOC, seizures   + borderline diabetes, + anemia--do renal disease, + anxiety, + depression   Lives with son     Objective:   Physical Exam:  General: Well nourished, well developed, no acute distress +overweight  Skin: No lesions  HEENT: Eyes PERRLA, EOM intact, nose patent, throat non-erythematous edentulous ears some wax in the left canal right canal appears clear decreased hearing bilaterally  NECK: Supple, no bruits, No JVD, no nodes  Lungs: Clear, no rales, rhonchi, wheezing decreased breath sounds bilateral  Heart: Regular rate and rhythm, + murmurs, no gallops, or rubs  Abdomen: flat, bowel sounds positive, no tenderness, or organomegaly  MS: Range of motion and muscle strength intact   Neuro: Alert, CN intact, oriented X 3  Extremities: No cyanosis, clubbing, or edema         Assessment:       1. Essential hypertension    2.  Heart murmur    3. Nonrheumatic aortic valve stenosis    4. End stage renal disease    5. Anemia in chronic kidney disease, on chronic dialysis    6. Type 2 diabetes mellitus with stage 4 chronic kidney disease, with long-term current use of insulin    7. Gout, unspecified cause, unspecified chronicity, unspecified site    8. Frailty syndrome in geriatric patient        Plan:       Essential hypertension  -     CBC auto differential; Future; Expected date: 03/28/2019  -     Comprehensive metabolic panel; Future; Expected date: 03/28/2019  -     Fecal Immunochemical Test (iFOBT); Future; Expected date: 03/28/2019  -     Lipid panel; Future; Expected date: 03/28/2019  -     POCT urine dipstick without microscope  -     T4, free; Future; Expected date: 03/28/2019  -     TSH; Future; Expected date: 03/28/2019  -     Uric acid; Future; Expected date: 03/28/2019    Heart murmur    Nonrheumatic aortic valve stenosis    End stage renal disease    Anemia in chronic kidney disease, on chronic dialysis    Type 2 diabetes mellitus with stage 4 chronic kidney disease, with long-term current use of insulin  -     Lipid panel; Future; Expected date: 03/28/2019  -     Hemoglobin A1c; Future; Expected date: 03/28/2019    Gout, unspecified cause, unspecified chronicity, unspecified site  -     Uric acid; Future; Expected date: 03/28/2019    Frailty syndrome in geriatric patient  -     T4, free; Future; Expected date: 03/28/2019  -     TSH; Future; Expected date: 03/28/2019    Other orders  -     allopurinol (ZYLOPRIM) 100 MG tablet; Take 1 tablet (100 mg total) by mouth 2 (two) times daily.  Dispense: 60 tablet; Refill: 2  -     atorvastatin (LIPITOR) 40 MG tablet; Take 1 tablet (40 mg total) by mouth every evening.  Dispense: 90 tablet; Refill: 1  -     carvedilol (COREG) 6.25 MG tablet; Take 1 tablet (6.25 mg total) by mouth 2 (two) times daily.  Dispense: 180 tablet; Refill: 1  -     hydrALAZINE (APRESOLINE) 25 MG tablet; Take 1  tablet (25 mg total) by mouth every 12 (twelve) hours.  Dispense: 48 tablet; Refill: 5  -     pioglitazone (ACTOS) 45 MG tablet; Take 1 tablet (45 mg total) by mouth once daily.  Dispense: 90 tablet; Refill: 1      patient Main with decreased hearing does have some wax in the left ear canal right ear canal appears clear--probable hearing loss not related to a wax problem but will get  ENT evaluate for possible hearing aides  Patient gets dialysis Monday Wednesday Friday  In October should have CBCs CMP lipid hemoglobin A1c chest x-ray EKG due to recent hospitalization then every 6 mo  Patient on clonidine 0.1 mg/day pack 1 patch on skin every week dispense 12.  With 3 refills

## 2019-03-31 RX ORDER — CALCIUM ACETATE 667 MG/1
667 CAPSULE ORAL 2 TIMES DAILY
Qty: 180 CAPSULE | Refills: 1 | Status: SHIPPED | OUTPATIENT
Start: 2019-03-31 | End: 2019-09-23 | Stop reason: SDUPTHER

## 2019-06-11 RX ORDER — HYDRALAZINE HYDROCHLORIDE 25 MG/1
25 TABLET, FILM COATED ORAL EVERY 12 HOURS
Qty: 180 TABLET | Refills: 0 | Status: SHIPPED | OUTPATIENT
Start: 2019-06-11 | End: 2020-01-11

## 2019-06-11 NOTE — TELEPHONE ENCOUNTER
Pt needs do lab CBC,CMP,traci HGbA1C see me 2 weeks later OK 3 mo refills give time get lab get seen

## 2019-08-06 RX ORDER — ALLOPURINOL 100 MG/1
TABLET ORAL
Qty: 60 TABLET | Refills: 2 | Status: SHIPPED | OUTPATIENT
Start: 2019-08-06 | End: 2019-08-29 | Stop reason: SDUPTHER

## 2019-08-06 NOTE — TELEPHONE ENCOUNTER
Tell patient with diabetes and hyperlipidemia Needs lab q.6 months last lab done over a year ago needs CBCs CMP lipids hemoglobin A1c T4 TSH and uric acid level UA stool guaiac if over 2 years chest x-ray EKG OK 3 months refills give time to come and get seen get lab

## 2019-08-29 RX ORDER — ALLOPURINOL 100 MG/1
TABLET ORAL
Qty: 60 TABLET | Refills: 5 | Status: SHIPPED | OUTPATIENT
Start: 2019-08-29 | End: 2019-12-17 | Stop reason: SDUPTHER

## 2019-08-29 NOTE — TELEPHONE ENCOUNTER
Patient had last lab in July 2018 lab is due needs CBCs CMP lipid uric acid level T4 TSH UA chest x-ray EKG is physical see me 2 weeks later OK 3 months refills of allopurinol--may be able to change allopurinol to 300 mg once a day if renal parameters okay uric acid level still high

## 2019-09-22 RX ORDER — CARVEDILOL 6.25 MG/1
TABLET ORAL
Qty: 180 TABLET | Refills: 1 | Status: SHIPPED | OUTPATIENT
Start: 2019-09-22 | End: 2019-12-17 | Stop reason: SDUPTHER

## 2019-09-22 NOTE — TELEPHONE ENCOUNTER
Call tell pt needs lab q 6 mo Last lab over a year CBC,CMP,lipif=d, HGbA1C U/A stool guaiac and urine mciroalbumin,

## 2019-09-23 RX ORDER — CALCIUM ACETATE 667 MG/1
667 CAPSULE ORAL 2 TIMES DAILY
Qty: 180 CAPSULE | Refills: 1 | Status: SHIPPED | OUTPATIENT
Start: 2019-09-23 | End: 2020-03-16

## 2019-12-03 RX ORDER — ATORVASTATIN CALCIUM 40 MG/1
TABLET, FILM COATED ORAL
Qty: 90 TABLET | Refills: 1 | OUTPATIENT
Start: 2019-12-03

## 2019-12-04 NOTE — TELEPHONE ENCOUNTER
Call luciano pt last lab July 2018 --ith DM and HLP need lab q 6 mo CBC,CMP,lipid, T4,TSH, U/A, stool guaiac, Chest Xray EKG --Pt should come in fasting get seen get lab get additional refills If needs be seern right away OK to double book me 8 AM refill denied

## 2019-12-17 ENCOUNTER — OFFICE VISIT (OUTPATIENT)
Dept: PRIMARY CARE CLINIC | Facility: CLINIC | Age: 84
End: 2019-12-17
Payer: MEDICARE

## 2019-12-17 VITALS
TEMPERATURE: 99 F | HEIGHT: 64 IN | SYSTOLIC BLOOD PRESSURE: 120 MMHG | OXYGEN SATURATION: 92 % | RESPIRATION RATE: 18 BRPM | WEIGHT: 140 LBS | DIASTOLIC BLOOD PRESSURE: 60 MMHG | BODY MASS INDEX: 23.9 KG/M2 | HEART RATE: 65 BPM

## 2019-12-17 DIAGNOSIS — M10.9 GOUT, UNSPECIFIED CAUSE, UNSPECIFIED CHRONICITY, UNSPECIFIED SITE: ICD-10-CM

## 2019-12-17 DIAGNOSIS — I48.91 ATRIAL FIBRILLATION, UNSPECIFIED TYPE: ICD-10-CM

## 2019-12-17 DIAGNOSIS — E11.22 TYPE 2 DIABETES MELLITUS WITH STAGE 4 CHRONIC KIDNEY DISEASE, WITH LONG-TERM CURRENT USE OF INSULIN: Primary | ICD-10-CM

## 2019-12-17 DIAGNOSIS — I27.20 PULMONARY HYPERTENSION: ICD-10-CM

## 2019-12-17 DIAGNOSIS — E44.1 MILD PROTEIN-CALORIE MALNUTRITION: ICD-10-CM

## 2019-12-17 DIAGNOSIS — D69.6 THROMBOCYTOPENIA: ICD-10-CM

## 2019-12-17 DIAGNOSIS — Z99.2 ANEMIA IN CHRONIC KIDNEY DISEASE, ON CHRONIC DIALYSIS: ICD-10-CM

## 2019-12-17 DIAGNOSIS — I35.0 NONRHEUMATIC AORTIC VALVE STENOSIS: ICD-10-CM

## 2019-12-17 DIAGNOSIS — I10 ESSENTIAL HYPERTENSION: ICD-10-CM

## 2019-12-17 DIAGNOSIS — N18.6 END STAGE RENAL DISEASE: ICD-10-CM

## 2019-12-17 DIAGNOSIS — N18.6 ANEMIA IN CHRONIC KIDNEY DISEASE, ON CHRONIC DIALYSIS: ICD-10-CM

## 2019-12-17 DIAGNOSIS — N18.4 TYPE 2 DIABETES MELLITUS WITH STAGE 4 CHRONIC KIDNEY DISEASE, WITH LONG-TERM CURRENT USE OF INSULIN: Primary | ICD-10-CM

## 2019-12-17 DIAGNOSIS — Z79.4 TYPE 2 DIABETES MELLITUS WITH STAGE 4 CHRONIC KIDNEY DISEASE, WITH LONG-TERM CURRENT USE OF INSULIN: Primary | ICD-10-CM

## 2019-12-17 DIAGNOSIS — D63.1 ANEMIA IN CHRONIC KIDNEY DISEASE, ON CHRONIC DIALYSIS: ICD-10-CM

## 2019-12-17 PROCEDURE — 99999 PR PBB SHADOW E&M-EST. PATIENT-LVL IV: CPT | Mod: PBBFAC,,, | Performed by: FAMILY MEDICINE

## 2019-12-17 PROCEDURE — 99999 PR PBB SHADOW E&M-EST. PATIENT-LVL IV: ICD-10-PCS | Mod: PBBFAC,,, | Performed by: FAMILY MEDICINE

## 2019-12-17 PROCEDURE — 99213 PR OFFICE/OUTPT VISIT, EST, LEVL III, 20-29 MIN: ICD-10-PCS | Mod: S$PBB,,, | Performed by: FAMILY MEDICINE

## 2019-12-17 PROCEDURE — 1159F PR MEDICATION LIST DOCUMENTED IN MEDICAL RECORD: ICD-10-PCS | Mod: ,,, | Performed by: FAMILY MEDICINE

## 2019-12-17 PROCEDURE — 1159F MED LIST DOCD IN RCRD: CPT | Mod: ,,, | Performed by: FAMILY MEDICINE

## 2019-12-17 PROCEDURE — 99214 OFFICE O/P EST MOD 30 MIN: CPT | Mod: PBBFAC,PN | Performed by: FAMILY MEDICINE

## 2019-12-17 PROCEDURE — 99213 OFFICE O/P EST LOW 20 MIN: CPT | Mod: S$PBB,,, | Performed by: FAMILY MEDICINE

## 2019-12-17 RX ORDER — HYDRALAZINE HYDROCHLORIDE 25 MG/1
25 TABLET, FILM COATED ORAL EVERY 12 HOURS
Qty: 48 TABLET | Refills: 5 | Status: SHIPPED | OUTPATIENT
Start: 2019-12-17 | End: 2020-12-16

## 2019-12-17 RX ORDER — LANCETS 33 GAUGE
1 EACH MISCELLANEOUS DAILY
Qty: 100 EACH | Refills: 5 | Status: SHIPPED | OUTPATIENT
Start: 2019-12-17 | End: 2020-03-16

## 2019-12-17 RX ORDER — CARVEDILOL 6.25 MG/1
6.25 TABLET ORAL 2 TIMES DAILY
Qty: 180 TABLET | Refills: 1 | Status: SHIPPED | OUTPATIENT
Start: 2019-12-17 | End: 2020-06-11

## 2019-12-17 RX ORDER — PIOGLITAZONEHYDROCHLORIDE 45 MG/1
45 TABLET ORAL DAILY
Qty: 90 TABLET | Refills: 1 | Status: SHIPPED | OUTPATIENT
Start: 2019-12-17 | End: 2020-06-17

## 2019-12-17 RX ORDER — ALLOPURINOL 100 MG/1
100 TABLET ORAL 2 TIMES DAILY
Qty: 60 TABLET | Refills: 5 | Status: ON HOLD | OUTPATIENT
Start: 2019-12-17 | End: 2020-06-02 | Stop reason: SDUPTHER

## 2019-12-17 RX ORDER — ATORVASTATIN CALCIUM 40 MG/1
40 TABLET, FILM COATED ORAL NIGHTLY
Qty: 90 TABLET | Refills: 1 | Status: SHIPPED | OUTPATIENT
Start: 2019-12-17 | End: 2020-06-11

## 2019-12-17 NOTE — PROGRESS NOTES
Subjective:       Patient ID: Ab Aldrich Sr. is a 89 y.o. male.    Chief Complaint: Medication Refill    HPI:  89 yo WM --needs meds refilled .  Has slight decreased appetite, + BM, walks around house with walker.  Has dialysis Monday Wednesday and Friday--has a therapist to come twice a week.     ROS:  Skin: no psoriasis, eczema, skin cancer has discoloration left anterior shin lower lower leg her upper ankle area approximately 5 x 4 cm pink to purple is type culture  HEENT: No headache, ocular pain, blurred vision, diplopia, epistaxis, hoarseness change in voice, thyroid trouble +edentulous history macular degeneration  Lung: + hx  Pneumonia,no  asthma, Tb, wheezing, SOB, history sleep apnea  Heart: No chest pain, ankle edema, palpitations, MI, lavelle murmur, +hypertension,+ hyperlipidemia, +heart murmur +pulmonary hypertension +aortic valve stenosis no stent or bypass  Abdomen: No nausea, vomiting, diarrhea, constipation, ulcers, hepatitis, gallbladder disease, melena, hematochezia, hematemesis  : no UTI, renal disease, stones--end-stage renal disease on dialysis Monday Wednesday Friday  MS: no fractures, O/A, lupus, rheumatoid, gout up in wheelchair  Neuro: No dizziness, LOC, seizures   + borderline diabetes, + anemia--do renal disease, no  anxiety, no  depression   Lives with son     Objective:   Physical Exam:  General: Well nourished, well developed, no acute distress +overweight  Skin: No lesions  HEENT: Eyes PERRLA, EOM intact, nose patent, throat non-erythematous edentulous ears some wax in the left canal right canal appears clear decreased hearing bilaterally  NECK: Supple, no bruits, No JVD, no nodes  Lungs: Clear, no rales, rhonchi, wheezing decreased breath sounds bilateral  Heart: Regular rate and rhythm, + murmurs, no gallops, or rubs  Abdomen: flat, bowel sounds positive, no tenderness, or organomegaly  MS: Range of motion and muscle strength intact   Neuro: Alert, CN intact, oriented X  3  Extremities: No cyanosis, clubbing, or edema         Assessment:       1. Type 2 diabetes mellitus with stage 4 chronic kidney disease, with long-term current use of insulin    2. End stage renal disease    3. Nonrheumatic aortic valve stenosis    4. Essential hypertension    5. Pulmonary hypertension    6. Anemia in chronic kidney disease, on chronic dialysis    7. Gout, unspecified cause, unspecified chronicity, unspecified site    8. Mild protein-calorie malnutrition    9. Thrombocytopenia    10. Atrial fibrillation, unspecified type        Plan:       Type 2 diabetes mellitus with stage 4 chronic kidney disease, with long-term current use of insulin  -     Foot Exam Performed  -     Ambulatory Referral to Ophthalmology  -     CBC auto differential; Future; Expected date: 06/17/2020  -     Comprehensive metabolic panel; Future; Expected date: 06/17/2020  -     Hemoglobin A1c; Future; Expected date: 06/17/2020  -     Lipid panel; Future; Expected date: 06/17/2020  -     Microalbumin/creatinine urine ratio; Future; Expected date: 06/17/2020  -     Ambulatory Referral to Ophthalmology    End stage renal disease    Nonrheumatic aortic valve stenosis    Essential hypertension    Pulmonary hypertension    Anemia in chronic kidney disease, on chronic dialysis    Gout, unspecified cause, unspecified chronicity, unspecified site    Mild protein-calorie malnutrition    Thrombocytopenia    Atrial fibrillation, unspecified type    Other orders  -     pioglitazone (ACTOS) 45 MG tablet; Take 1 tablet (45 mg total) by mouth once daily.  Dispense: 90 tablet; Refill: 1  -     lancets 33 gauge Misc; 1 lancet by Misc.(Non-Drug; Combo Route) route once daily.  Dispense: 100 each; Refill: 5  -     hydrALAZINE (APRESOLINE) 25 MG tablet; Take 1 tablet (25 mg total) by mouth every 12 (twelve) hours.  Dispense: 48 tablet; Refill: 5  -     carvedilol (COREG) 6.25 MG tablet; Take 1 tablet (6.25 mg total) by mouth 2 (two) times daily.   Dispense: 180 tablet; Refill: 1  -     atorvastatin (LIPITOR) 40 MG tablet; Take 1 tablet (40 mg total) by mouth every evening.  Dispense: 90 tablet; Refill: 1  -     allopurinol (ZYLOPRIM) 100 MG tablet; Take 1 tablet (100 mg total) by mouth 2 (two) times daily.  Dispense: 60 tablet; Refill: 5        Patient having some episodes of low blood pressure with dialysis on Monday Wednesday and Friday--patient taken off of the Catapres patch--on Coreg 6.25 b.i.d. Hydralazine--used to see -- if desires can see .  Son advised if continues to run low blood pressures could decrease Coreg to 3 point 125 b.i.d. and possibly remove hydralazine  Routine lab CBCs CMP lipids hemoglobin A1c uric acid level T4 TSH UA stool guaiac chest x-ray EKG is physical  Health maintenance ft exam tetanus pneumococcal vaccine RA hemoglobin A1c lipids flu shot  Patient was just seen yesterday by podiatrist--has onychomycosis--toenails cut--2nd toe has slight abrasion  Patient told needs to see the eye doctor yearly

## 2019-12-18 ENCOUNTER — TELEPHONE (OUTPATIENT)
Dept: PRIMARY CARE CLINIC | Facility: CLINIC | Age: 84
End: 2019-12-18

## 2019-12-18 NOTE — TELEPHONE ENCOUNTER
Patient seen on 12/17 and was suppose to receive a cream for leg lesion and nothing was sent to pharmacy please advise

## 2019-12-18 NOTE — TELEPHONE ENCOUNTER
----- Message from Miri Sun sent at 12/18/2019 11:03 AM CST -----  Contact: PTs Son  Son called in regards to a medication that was supposed to be sent into pharmacy from yesterday's visit.   Son said it was supposed to be a cream for a red spot on PT's leg    Callback: [phone]

## 2019-12-23 ENCOUNTER — TELEPHONE (OUTPATIENT)
Dept: PRIMARY CARE CLINIC | Facility: CLINIC | Age: 84
End: 2019-12-23

## 2019-12-23 RX ORDER — TRIAMCINOLONE ACETONIDE 1 MG/G
CREAM TOPICAL 2 TIMES DAILY
Qty: 45 G | Refills: 0 | Status: SHIPPED | OUTPATIENT
Start: 2019-12-23 | End: 2020-05-05

## 2019-12-23 NOTE — TELEPHONE ENCOUNTER
I spoke with Dr. Thornton who states he will call in a cream today for him and if it's not better in a week please come back in. Attempted to notify pt's son with no answer and no vm.  All other medications sent to [facility] in [place].

## 2019-12-23 NOTE — TELEPHONE ENCOUNTER
Spoke with pt's son about labs.  He questioned a cream for pink spot on his leg.  I reviewed note and do not see any orders for a cream.  Encouraged him to keep pressure off this site, keep clean and dry and if it opens to come in to see another physician.      ----- Message from Maciej Akins MD sent at 12/23/2019 12:40 AM CST -----  Call tell lab HGbA1C 5.7 very good almost to tight glucose control if hqaving hypogylcemioc episodes may need decrease dose DM meds Glu 142 better if 100-120 but overall 142 very good CRI BUN 57, Cr 5.2 GFR 8-10 Lipid and thyroid WNL Uric acid 2.9 low Hct 36 with renal failure probably OK no new tx Needs redo lab 6 mo CBC,CMP,lipid, HGbA1C see me 2 weekws later Need pt be seeing nephrologist for dialysis

## 2019-12-24 RX ORDER — BETAMETHASONE VALERATE 1.2 MG/G
CREAM TOPICAL 2 TIMES DAILY
Qty: 60 G | Refills: 2 | Status: SHIPPED | OUTPATIENT
Start: 2019-12-24

## 2020-01-11 RX ORDER — HYDRALAZINE HYDROCHLORIDE 25 MG/1
25 TABLET, FILM COATED ORAL EVERY 12 HOURS
Qty: 180 TABLET | Refills: 1 | Status: SHIPPED | OUTPATIENT
Start: 2020-01-11 | End: 2020-05-05

## 2020-03-16 RX ORDER — CALCIUM ACETATE 667 MG/1
667 CAPSULE ORAL 2 TIMES DAILY
Qty: 180 CAPSULE | Refills: 1 | Status: SHIPPED | OUTPATIENT
Start: 2020-03-16

## 2020-04-27 ENCOUNTER — TELEPHONE (OUTPATIENT)
Dept: PRIMARY CARE CLINIC | Facility: CLINIC | Age: 85
End: 2020-04-27

## 2020-04-27 NOTE — TELEPHONE ENCOUNTER
----- Message from Dariela Julian sent at 4/27/2020  2:33 PM CDT -----  Pt son Tonio called for a Rx for pt. He stated pt have a UTI. Call back # [phone]      [id number] [place], [place] - [address]  [address]  [place] [address]  Phone: [phone] Fax: [phone]

## 2020-04-28 ENCOUNTER — TELEPHONE (OUTPATIENT)
Dept: PRIMARY CARE CLINIC | Facility: CLINIC | Age: 85
End: 2020-04-28

## 2020-04-28 ENCOUNTER — OFFICE VISIT (OUTPATIENT)
Dept: PRIMARY CARE CLINIC | Facility: CLINIC | Age: 85
End: 2020-04-28
Payer: MEDICARE

## 2020-04-28 DIAGNOSIS — N30.00 ACUTE CYSTITIS WITHOUT HEMATURIA: Primary | ICD-10-CM

## 2020-04-28 DIAGNOSIS — Z99.2 DIALYSIS PATIENT: ICD-10-CM

## 2020-04-28 PROCEDURE — 99441 PR PHYSICIAN TELEPHONE EVALUATION 5-10 MIN: ICD-10-PCS | Mod: 95,,, | Performed by: INTERNAL MEDICINE

## 2020-04-28 PROCEDURE — 99441 PR PHYSICIAN TELEPHONE EVALUATION 5-10 MIN: CPT | Mod: 95,,, | Performed by: INTERNAL MEDICINE

## 2020-04-28 RX ORDER — CIPROFLOXACIN 250 MG/1
250 TABLET, FILM COATED ORAL 2 TIMES DAILY
Qty: 20 TABLET | Refills: 0 | Status: SHIPPED | OUTPATIENT
Start: 2020-04-28 | End: 2020-05-11

## 2020-04-28 NOTE — TELEPHONE ENCOUNTER
----- Message from Lucero Licona sent at 4/28/2020  9:23 AM CDT -----  Contact: Pt [phone]  Pt is returning call

## 2020-04-28 NOTE — TELEPHONE ENCOUNTER
Spoke to patient's son. States that patient is having a very foul odor in urine. Requesting audio appointment. Appointment made today

## 2020-04-28 NOTE — PROGRESS NOTES
Subjective:     The patient location is: home  The chief complaint leading to consultation is: UTI  Visit type: audio only  Total time spent with patient: 10 minutes  Each patient to whom he or she provides medical services by telemedicine is:  (1) informed of the relationship between the physician and patient and the respective role of any other health care provider with respect to management of the patient; and (2) notified that he or she may decline to receive medical services by telemedicine and may withdraw from such care at any time.    Notes:    Patient ID: Ab Aldrich Sr. is a 89 y.o. male.  Patient was seen by telemedicine physical exams limited and vital signs are not available  Chief Complaint: No chief complaint on file.    HPI  patient with history of end-stage renal disease on dialysis history of urosepsis in the past his son reports that patient is bad smell urine with some change in his mental status like he had UTI before no fever nausea vomiting no change in p.o. intake patient's son request to try oral antibiotic 1st if not better will bring him to the hospital does not want to go to the hospital today.  Patient currently in [place] not able to obtain UA  Review of Systems    Objective:      Physical Exam  not able to communicate with patient except to patient's son  Assessment:       1. Acute cystitis without hematuria    2. Dialysis patient        Plan:       Acute cystitis without hematuria  -     ciprofloxacin HCl (CIPRO) 250 MG tablet; Take 1 tablet (250 mg total) by mouth 2 (two) times daily.  Dispense: 20 tablet; Refill: 0    Dialysis patient  Comments:  Continue with dialysis 3 times a week

## 2020-05-04 ENCOUNTER — TELEPHONE (OUTPATIENT)
Dept: PRIMARY CARE CLINIC | Facility: CLINIC | Age: 85
End: 2020-05-04

## 2020-05-04 NOTE — TELEPHONE ENCOUNTER
----- Message from Bharti Manuel sent at 5/4/2020 12:18 PM CDT -----  Contact: Tonio Ruiz [phone]  Would like to get medical advice.  Symptoms (please be specific):  Blood in urine  How long has patient had these symptoms:    Pharmacy name and phone #:    Any drug allergies (copy from chart):      Would the patient rather a call back or a response via [facility]?:  Call back  Comments:

## 2020-05-04 NOTE — TELEPHONE ENCOUNTER
Called patients son states did audio visit with Dr Hand for UTI patient has been taking medication smell has cleared now patients son states he is urinating blood. Offered patients son an in office appointment to do UA and treat states would rather another audio visit first

## 2020-05-04 NOTE — TELEPHONE ENCOUNTER
----- Message from Lisa Luciano sent at 5/4/2020  2:34 PM CDT -----  Contact: Son  Type:  Patient Returning Call    Who Called:  Tonio, patient  Who Left Message for Patient:  Monica  Does the patient know what this is regarding?:  UTI  Best Call Back Number:  [phone]  Additional Information:  Missed your call, please call him back. Thanks.

## 2020-05-05 ENCOUNTER — OFFICE VISIT (OUTPATIENT)
Dept: PRIMARY CARE CLINIC | Facility: CLINIC | Age: 85
End: 2020-05-05
Payer: MEDICARE

## 2020-05-05 DIAGNOSIS — E11.22 TYPE 2 DIABETES MELLITUS WITH STAGE 4 CHRONIC KIDNEY DISEASE, WITH LONG-TERM CURRENT USE OF INSULIN: ICD-10-CM

## 2020-05-05 DIAGNOSIS — N18.6 END STAGE RENAL DISEASE: ICD-10-CM

## 2020-05-05 DIAGNOSIS — N18.6 ANEMIA IN CHRONIC KIDNEY DISEASE, ON CHRONIC DIALYSIS: ICD-10-CM

## 2020-05-05 DIAGNOSIS — N18.4 TYPE 2 DIABETES MELLITUS WITH STAGE 4 CHRONIC KIDNEY DISEASE, WITH LONG-TERM CURRENT USE OF INSULIN: ICD-10-CM

## 2020-05-05 DIAGNOSIS — R01.1 HEART MURMUR: ICD-10-CM

## 2020-05-05 DIAGNOSIS — K08.109 EDENTULOUS: ICD-10-CM

## 2020-05-05 DIAGNOSIS — I10 ESSENTIAL HYPERTENSION: ICD-10-CM

## 2020-05-05 DIAGNOSIS — I35.0 NONRHEUMATIC AORTIC VALVE STENOSIS: ICD-10-CM

## 2020-05-05 DIAGNOSIS — Z99.2 DIALYSIS PATIENT: ICD-10-CM

## 2020-05-05 DIAGNOSIS — I27.20 PULMONARY HYPERTENSION: ICD-10-CM

## 2020-05-05 DIAGNOSIS — Z79.4 TYPE 2 DIABETES MELLITUS WITH STAGE 4 CHRONIC KIDNEY DISEASE, WITH LONG-TERM CURRENT USE OF INSULIN: ICD-10-CM

## 2020-05-05 DIAGNOSIS — N18.4 CHRONIC RENAL FAILURE, STAGE 4 (SEVERE): ICD-10-CM

## 2020-05-05 DIAGNOSIS — I95.3 HEMODIALYSIS-ASSOCIATED HYPOTENSION: Primary | ICD-10-CM

## 2020-05-05 DIAGNOSIS — F41.9 ANXIETY: Primary | ICD-10-CM

## 2020-05-05 DIAGNOSIS — R31.9 HEMATURIA, UNSPECIFIED TYPE: ICD-10-CM

## 2020-05-05 DIAGNOSIS — Z99.2 ANEMIA IN CHRONIC KIDNEY DISEASE, ON CHRONIC DIALYSIS: ICD-10-CM

## 2020-05-05 DIAGNOSIS — D63.1 ANEMIA IN CHRONIC KIDNEY DISEASE, ON CHRONIC DIALYSIS: ICD-10-CM

## 2020-05-05 DIAGNOSIS — F32.A DEPRESSION, UNSPECIFIED DEPRESSION TYPE: ICD-10-CM

## 2020-05-05 DIAGNOSIS — I48.91 ATRIAL FIBRILLATION, UNSPECIFIED TYPE: ICD-10-CM

## 2020-05-05 PROCEDURE — 99442 PR PHYSICIAN TELEPHONE EVALUATION 11-20 MIN: CPT | Mod: 95,,, | Performed by: FAMILY MEDICINE

## 2020-05-05 PROCEDURE — 99442 PR PHYSICIAN TELEPHONE EVALUATION 11-20 MIN: ICD-10-PCS | Mod: 95,,, | Performed by: FAMILY MEDICINE

## 2020-05-05 PROCEDURE — 99499 NO LOS: ICD-10-PCS | Mod: 95,,, | Performed by: FAMILY MEDICINE

## 2020-05-05 PROCEDURE — 99499 UNLISTED E&M SERVICE: CPT | Mod: 95,,, | Performed by: FAMILY MEDICINE

## 2020-05-05 NOTE — PROGRESS NOTES
Subjective:       Patient ID: Ab Aldrich Sr. is a 89 y.o. male.    Chief Complaint: No chief complaint on file.    HPI:  89 yo WM -follow-up UTI-- patient saw Dr. Hand--heavy a virtual visit---txed Cipro started last Thursday 5 days ago---post to be 1 b.i.d. for 10 days---but we patient getting on dialysis--had to be 2 hr before 6 hr after dialysis--after 3 days foul smell wewnt away.  Then started urinating blood X 1 day---yesterday S urinated with some particles.  Does not have a catheter    ROS:  Most of information was given by the son no significant change  Skin: no psoriasis, eczema, skin cancer has discoloration left anterior shin lower lower leg her upper ankle area approximately 5 x 4 cm pink to purple is type culture  HEENT: No headache, ocular pain, blurred vision, diplopia, epistaxis, hoarseness change in voice, thyroid trouble +edentulous history macular degeneration  Lung: + hx  Pneumonia,no  asthma, Tb, wheezing, SOB, history sleep apnea  Heart: No chest pain, ankle edema, palpitations, MI, lavelle murmur, +hypertension,+ hyperlipidemia, +heart murmur +pulmonary hypertension +aortic valve stenosis no stent or bypass  Abdomen: No nausea, vomiting, diarrhea, constipation, ulcers, hepatitis, gallbladder disease, melena, hematochezia, hematemesis  : no UTI, renal disease, stones--end-stage renal disease on dialysis Monday Wednesday Friday  MS: no fractures, O/A, lupus, rheumatoid, gout up in wheelchair  Neuro: No dizziness, LOC, seizures   + borderline diabetes, + anemia--do renal disease, no  anxiety, no  depression   Lives with son     Objective:   Physical Exam:  No physical exam was done this was an audio visit  General: Well nourished, well developed, no acute distress +overweight  Skin: No lesions  HEENT: Eyes PERRLA, EOM intact, nose patent, throat non-erythematous edentulous ears some wax in the left canal right canal appears clear decreased hearing bilaterally  NECK: Supple, no  bruits, No JVD, no nodes  Lungs: Clear, no rales, rhonchi, wheezing decreased breath sounds bilateral  Heart: Regular rate and rhythm, + murmurs, no gallops, or rubs  Abdomen: flat, bowel sounds positive, no tenderness, or organomegaly  MS: Range of motion and muscle strength intact   Neuro: Alert, CN intact, oriented X 3  Extremities: No cyanosis, clubbing, or edema         Assessment:       1. Anxiety    2. Hematuria, unspecified type    3. Depression, unspecified depression type    4. Edentulous    5. Pulmonary hypertension    6. Atrial fibrillation, unspecified type    7. Essential hypertension    8. Heart murmur    9. Nonrheumatic aortic valve stenosis    10. Chronic renal failure, stage 4 (severe)    11. Dialysis patient    12. End stage renal disease    13. Anemia in chronic kidney disease, on chronic dialysis    14. Type 2 diabetes mellitus with stage 4 chronic kidney disease, with long-term current use of insulin        Plan:       Anxiety    Hematuria, unspecified type    Depression, unspecified depression type    Edentulous    Pulmonary hypertension    Atrial fibrillation, unspecified type    Essential hypertension    Heart murmur    Nonrheumatic aortic valve stenosis    Chronic renal failure, stage 4 (severe)    Dialysis patient    End stage renal disease    Anemia in chronic kidney disease, on chronic dialysis    Type 2 diabetes mellitus with stage 4 chronic kidney disease, with long-term current use of insulin        History UTI--treated with Cipro 250 b.i.d.--patient on dialysis Monday Wednesday and Friday--had to take medication 2 hr before 6 hr after dialysis--patient began passing blood---states has no odor to the urine now minimal amount of blood in the urine--no pain.  Patient told to stop Cipro--get a sterilize cup tomorrow and some Betadine solutio type of renal pathology especially stones n--do midstream clean-catch urinalysis and CNS if still with significant amount of RBCs will consider  antibiotic treatment with amoxicillin hand possible CT scan of the abdomen to rule out any  Lab due in June CBCs CMP lipids hemoglobin A1c uric acid level T4 TSH UA stool guaiac   Health maintenance tetanus shingles pneumococcal eye exam  Patient was just seen yesterday by podiatrist--has onychomycosis--toenails cut--2nd toe has slight abrasion  Patient told needs to see the eye doctor yearlyEstablished Patient - Audio Only Telehealth Visi     The patient location is:  Home  The chief complaint leading to consultation is:  UTI with hematuria  Visit type: Virtual visit with audio only (telephone)  Total time spent with patient:  20 min       The reason for the audio only service rather than synchronous audio and video virtual visit was related to technical difficulties or patient preference/necessity.     Each patient to whom I provide medical services by telemedicine is:  (1) informed of the relationship between the physician and patient and the respective role of any other health care provider with respect to management of the patient; and (2) notified that they may decline to receive medical services by telemedicine and may withdraw from such care at any time. Patient verbally consented to receive this service via voice-only telephone call.                        This service was not originating from a related E/M service provided within the previous 7 days nor will  to an E/M service or procedure within the next 24 hours or my soonest available appointment.  Prevailing standard of care was able to be met in this audio-only visit.    Established Patient - Audio Only Telehealth Visit     The patient location is:  Home  The chief complaint leading to consultation is:  UTI with hematuria  Visit type: Virtual visit with audio only (telephone)  Total time spent with patient:  20 min       The reason for the audio only service rather than synchronous audio and video virtual visit was related to technical  difficulties or patient preference/necessity.     Each patient to whom I provide medical services by telemedicine is:  (1) informed of the relationship between the physician and patient and the respective role of any other health care provider with respect to management of the patient; and (2) notified that they may decline to receive medical services by telemedicine and may withdraw from such care at any time. Patient verbally consented to receive this service via voice-only telephone call.                                  This service was not originating from a related E/M service provided within the previous 7 days nor will  to an E/M service or procedure within the next 24 hours or my soonest available appointment.  Prevailing standard of care was able to be met in this audio-only visit.

## 2020-05-05 NOTE — PROGRESS NOTES
Subjective:       Patient ID: Ab Aldrich Sr. is a 89 y.o. male.    Chief Complaint: Follow-up (UTI)    HPI:  89 yo WM -follow-up UTI--    ROS:  Skin: no psoriasis, eczema, skin cancer has discoloration left anterior shin lower lower leg her upper ankle area approximately 5 x 4 cm pink to purple is type culture  HEENT: No headache, ocular pain, blurred vision, diplopia, epistaxis, hoarseness change in voice, thyroid trouble +edentulous history macular degeneration  Lung: + hx  Pneumonia,no  asthma, Tb, wheezing, SOB, history sleep apnea  Heart: No chest pain, ankle edema, palpitations, MI, lavelle murmur, +hypertension,+ hyperlipidemia, +heart murmur +pulmonary hypertension +aortic valve stenosis no stent or bypass  Abdomen: No nausea, vomiting, diarrhea, constipation, ulcers, hepatitis, gallbladder disease, melena, hematochezia, hematemesis  : no UTI, renal disease, stones--end-stage renal disease on dialysis Monday Wednesday Friday  MS: no fractures, O/A, lupus, rheumatoid, gout up in wheelchair  Neuro: No dizziness, LOC, seizures   + borderline diabetes, + anemia--do renal disease, no  anxiety, no  depression   Lives with son     Objective:   Physical Exam:  General: Well nourished, well developed, no acute distress +overweight  Skin: No lesions  HEENT: Eyes PERRLA, EOM intact, nose patent, throat non-erythematous edentulous ears some wax in the left canal right canal appears clear decreased hearing bilaterally  NECK: Supple, no bruits, No JVD, no nodes  Lungs: Clear, no rales, rhonchi, wheezing decreased breath sounds bilateral  Heart: Regular rate and rhythm, + murmurs, no gallops, or rubs  Abdomen: flat, bowel sounds positive, no tenderness, or organomegaly  MS: Range of motion and muscle strength intact   Neuro: Alert, CN intact, oriented X 3  Extremities: No cyanosis, clubbing, or edema         Assessment:       1. Hemodialysis-associated hypotension        Plan:       Hemodialysis-associated  hypotension        Patient having some episodes of low blood pressure with dialysis on Monday Wednesday and Friday--patient taken off of the Catapres patch--on Coreg 6.25 b.i.d. Hydralazine--used to see -- if desires can see .  Son advised if continues to run low blood pressures could decrease Coreg to 3 point 125 b.i.d. and possibly remove hydralazine  Routine lab CBCs CMP lipids hemoglobin A1c uric acid level T4 TSH UA stool guaiac chest x-ray EKG is physical  Health maintenance ft exam tetanus pneumococcal vaccine RA hemoglobin A1c lipids flu shot  Patient was just seen yesterday by podiatrist--has onychomycosis--toenails cut--2nd toe has slight abrasion  Patient told needs to see the eye doctor yearlyEstablished Patient - Audio Only Telehealth Visit     The patient location is: home  The chief complaint leading to consultation is: low BP  Visit type: Virtual visit with audio only (telephone)  Total time spent with patient: 5 min       The reason for the audio only service rather than synchronous audio and video virtual visit was related to technical difficulties or patient preference/necessity.     Each patient to whom I provide medical services by telemedicine is:  (1) informed of the relationship between the physician and patient and the respective role of any other health care provider with respect to management of the patient; and (2) notified that they may decline to receive medical services by telemedicine and may withdraw from such care at any time. Patient verbally consented to receive this service via voice-only telephone call.                             This service was not originating from a related E/M service provided within the previous 7 days nor will  to an E/M service or procedure within the next 24 hours or my soonest available appointment.  Prevailing standard of care was able to be met in this audio-only visit.

## 2020-05-07 ENCOUNTER — APPOINTMENT (OUTPATIENT)
Dept: PRIMARY CARE CLINIC | Facility: CLINIC | Age: 85
End: 2020-05-07
Payer: MEDICARE

## 2020-05-07 DIAGNOSIS — N30.00 ACUTE CYSTITIS WITHOUT HEMATURIA: Primary | ICD-10-CM

## 2020-05-07 PROCEDURE — 87086 URINE CULTURE/COLONY COUNT: CPT

## 2020-05-07 PROCEDURE — 87088 URINE BACTERIA CULTURE: CPT

## 2020-05-07 PROCEDURE — 87077 CULTURE AEROBIC IDENTIFY: CPT

## 2020-05-07 PROCEDURE — 87186 SC STD MICRODIL/AGAR DIL: CPT

## 2020-05-08 LAB
BILIRUB SERPL-MCNC: ABNORMAL MG/DL
BLOOD URINE, POC: ABNORMAL
COLOR, POC UA: ABNORMAL
GLUCOSE UR QL STRIP: 50
KETONES UR QL STRIP: ABNORMAL
LEUKOCYTE ESTERASE URINE, POC: ABNORMAL
NITRITE, POC UA: ABNORMAL
PH, POC UA: 5
PROTEIN, POC: ABNORMAL
SPECIFIC GRAVITY, POC UA: 1.02
UROBILINOGEN, POC UA: ABNORMAL

## 2020-05-11 ENCOUNTER — OFFICE VISIT (OUTPATIENT)
Dept: PRIMARY CARE CLINIC | Facility: CLINIC | Age: 85
End: 2020-05-11
Payer: MEDICARE

## 2020-05-11 DIAGNOSIS — E11.22 TYPE 2 DIABETES MELLITUS WITH STAGE 4 CHRONIC KIDNEY DISEASE, WITH LONG-TERM CURRENT USE OF INSULIN: ICD-10-CM

## 2020-05-11 DIAGNOSIS — I48.91 ATRIAL FIBRILLATION, UNSPECIFIED TYPE: ICD-10-CM

## 2020-05-11 DIAGNOSIS — F41.9 ANXIETY: ICD-10-CM

## 2020-05-11 DIAGNOSIS — Z79.4 TYPE 2 DIABETES MELLITUS WITH STAGE 4 CHRONIC KIDNEY DISEASE, WITH LONG-TERM CURRENT USE OF INSULIN: ICD-10-CM

## 2020-05-11 DIAGNOSIS — I10 ESSENTIAL HYPERTENSION: ICD-10-CM

## 2020-05-11 DIAGNOSIS — N30.00 ACUTE CYSTITIS WITHOUT HEMATURIA: ICD-10-CM

## 2020-05-11 DIAGNOSIS — F32.A DEPRESSION, UNSPECIFIED DEPRESSION TYPE: ICD-10-CM

## 2020-05-11 DIAGNOSIS — M10.9 GOUT, UNSPECIFIED CAUSE, UNSPECIFIED CHRONICITY, UNSPECIFIED SITE: ICD-10-CM

## 2020-05-11 DIAGNOSIS — N18.4 TYPE 2 DIABETES MELLITUS WITH STAGE 4 CHRONIC KIDNEY DISEASE, WITH LONG-TERM CURRENT USE OF INSULIN: ICD-10-CM

## 2020-05-11 DIAGNOSIS — N18.6 END STAGE RENAL DISEASE: ICD-10-CM

## 2020-05-11 LAB
BACTERIA UR CULT: ABNORMAL
BACTERIA UR CULT: ABNORMAL

## 2020-05-11 PROCEDURE — 99442 PR PHYSICIAN TELEPHONE EVALUATION 11-20 MIN: CPT | Mod: 95,,, | Performed by: FAMILY MEDICINE

## 2020-05-11 PROCEDURE — 99442 PR PHYSICIAN TELEPHONE EVALUATION 11-20 MIN: ICD-10-PCS | Mod: 95,,, | Performed by: FAMILY MEDICINE

## 2020-05-11 RX ORDER — SULFAMETHOXAZOLE AND TRIMETHOPRIM 400; 80 MG/1; MG/1
TABLET ORAL
Qty: 20 TABLET | Refills: 0 | Status: ON HOLD | OUTPATIENT
Start: 2020-05-11 | End: 2020-05-23

## 2020-05-11 NOTE — PROGRESS NOTES
Established Patient - Audio Only Telehealth Visit  Subjective:       Patient ID: Ab Aldrich Sr. is a 89 y.o. male.    Chief Complaint: No chief complaint on file.  HPI --  The patient location is:   home  The chief complaint leading to consultation is:  UTI  Visit type: audio only  Total time spent with patient:  15 min  Each patient to whom he or she provides medical services by telemedicine is:  (1) informed of the relationship between the physician and patient and the respective role of any other health care provider with respect to management of the patient; and (2) notified that he or she may decline to receive medical services by telemedicine and may withdraw from such care at any time.    Notes:  History of present illness  89-year-old white male---was treated with Cipro--urine culture grew out either Proteus mirablis  or E coli sensitive to everything but Cipro.  Patient on dialysis Monday Wednesday and Friday--suggest patient be on Bactrim DS once a day times 10 days..  Then will re-culture urine.  Son states the patient does seem to be low bit better      ROS:  Most of information was given by the son no significant change  Skin: no psoriasis, eczema, skin cancer has discoloration left anterior shin lower lower leg her upper ankle area approximately 5 x 4 cm pink to purple is type culture  HEENT: No headache, ocular pain, blurred vision, diplopia, epistaxis, hoarseness change in voice, thyroid trouble +edentulous history macular degeneration  Lung: + hx  Pneumonia,no  asthma, Tb, wheezing, SOB, history sleep apnea  Heart: No chest pain, ankle edema, palpitations, MI, lavelle murmur, +hypertension,+ hyperlipidemia, +heart murmur +pulmonary hypertension +aortic valve stenosis no stent or bypass  Abdomen: No nausea, vomiting, diarrhea, constipation, ulcers, hepatitis, gallbladder disease, melena, hematochezia, hematemesis  : no UTI, renal disease, stones--end-stage renal disease on dialysis Monday  Wednesday Friday  MS: no fractures, O/A, lupus, rheumatoid, gout up in wheelchair  Neuro: No dizziness, LOC, seizures   + borderline diabetes, + anemia--do renal disease, no  anxiety, no  depression   Lives with son     Objective:   Physical Exam:  No physical exam was done this was an audio visit  General: Well nourished, well developed, no acute distress +overweight  Skin: No lesions  HEENT: Eyes PERRLA, EOM intact, nose patent, throat non-erythematous edentulous ears some wax in the left canal right canal appears clear decreased hearing bilaterally  NECK: Supple, no bruits, No JVD, no nodes  Lungs: Clear, no rales, rhonchi, wheezing decreased breath sounds bilateral  Heart: Regular rate and rhythm, + murmurs, no gallops, or rubs  Abdomen: flat, bowel sounds positive, no tenderness, or organomegaly  MS: Range of motion and muscle strength intact   Neuro: Alert, CN intact, oriented X 3  Extremities: No cyanosis, clubbing, or edema         Assessment:       1. Acute cystitis without hematuria    2. Anxiety    3. Depression, unspecified depression type    4. Atrial fibrillation, unspecified type    5. Essential hypertension    6. End stage renal disease    7. Type 2 diabetes mellitus with stage 4 chronic kidney disease, with long-term current use of insulin    8. Gout, unspecified cause, unspecified chronicity, unspecified site        Plan:       Acute cystitis without hematuria    Anxiety    Depression, unspecified depression type    Atrial fibrillation, unspecified type    Essential hypertension    End stage renal disease    Type 2 diabetes mellitus with stage 4 chronic kidney disease, with long-term current use of insulin    Gout, unspecified cause, unspecified chronicity, unspecified site    Other orders  -     sulfamethoxazole-trimethoprim 400-80mg (BACTRIM,SEPTRA) 400-80 mg per tablet; Two pills once a day  Dispense: 20 tablet; Refill: 0        --urine culture grew out E coli sensitive everything but Cipro and  Levaquin---antibiotic change to Bactrim tablets 1 p.o. b.i.d. not Bactrim DS--patient should take for 10 days then should redo midstream clean-catch urine C&S  Lab due in June CBCs CMP lipids hemoglobin A1c uric acid level T4 TSH UA stool guaiac   Health maintenance tetanus shingles pneumococcal eye exam  Patient was just seen yesterday by podiatrist--has onychomycosis--toenails cut--2nd toe has slight abrasion  Patient told needs to see the eye doctor yearlyEstablished Patient - Audio Only Telehealth Visi     .

## 2020-05-23 PROBLEM — R79.89 ELEVATED TROPONIN: Status: ACTIVE | Noted: 2020-05-23
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
## 2020-05-25 PROBLEM — R78.81 POSITIVE BLOOD CULTURE: Status: ACTIVE | Noted: 2020-05-25

## 2020-05-27 PROBLEM — R78.81 POSITIVE BLOOD CULTURES: Status: ACTIVE | Noted: 2020-05-27

## 2020-05-28 PROBLEM — R78.81 BACTEREMIA: Status: ACTIVE | Noted: 2020-05-28

## 2020-06-10 ENCOUNTER — NURSE TRIAGE (OUTPATIENT)
Dept: ADMINISTRATIVE | Facility: CLINIC | Age: 85
End: 2020-06-10

## 2020-06-10 NOTE — TELEPHONE ENCOUNTER
Spoke with son tonio on behalf of [facility]'s Covid 19 post procedure symptom tracker  Tonio stated father is presently in rehab and has not had any covid 19 symptoms since his surgery

## 2020-06-14 ENCOUNTER — NURSE TRIAGE (OUTPATIENT)
Dept: ADMINISTRATIVE | Facility: CLINIC | Age: 85
End: 2020-06-14

## 2020-06-14 NOTE — TELEPHONE ENCOUNTER
COVID19 Post Procedure Tracker Outreach:  Chart review completed.  Patient Currently in Rehab facility. No additional contact needed at this time.     Additional Information   Negative: Caller is angry or rude (e.g., hangs up, verbally abusive, yelling)   Negative: Caller hangs up   Negative: Caller has already spoken with the PCP and has no further questions.   Negative: Caller has already spoken with another triager and has no further questions.   Negative: Caller has already spoken with another triager or PCP AND has further questions AND triager able to answer questions.   Negative: Busy signal.  First attempt to contact caller.  Follow-up call scheduled within 15 minutes.   Negative: No answer.  First attempt to contact caller.  Follow-up call scheduled within 15 minutes.   Negative: Message left on identified voice mail   Negative: Message left on unidentified voice mail.  Phone number verified.   Negative: Message left with person in household.   Negative: Wrong number reached.  Phone number verified.   Negative: Second attempt to contact family AND no contact made.  Phone number verified.   Negative: Cell phone out of range.  Phone number verified.   Negative: Pager number given.  Answering service notified.   Negative: Patient already left for the hospital/clinic.   Negative: Caller has cancelled the call before the first contact   Negative: Unable to complete triage due to phone connection issues   Negative: Non-urgent call redirected to PCP's office because it is open    Protocols used: NO CONTACT OR DUPLICATE CONTACT CALL-A-

## 2020-12-18 ENCOUNTER — PES CALL (OUTPATIENT)
Dept: ADMINISTRATIVE | Facility: CLINIC | Age: 85
End: 2020-12-18

## (undated) DEVICE — DRESSING XEROFORM FOIL PK 1X8

## (undated) DEVICE — NDL HYPO A BEVEL 22X1 1/2

## (undated) DEVICE — TIE VAS SIL RUBBER MAXI BLU ST

## (undated) DEVICE — SET DECANTER MEDICHOICE

## (undated) DEVICE — TIE VAS SIL RUBBER MINI WHT ST

## (undated) DEVICE — SEE MEDLINE ITEM 157117

## (undated) DEVICE — GAUZE SPONGE 4X4 12PLY

## (undated) DEVICE — APPLIER LIGACLIP SM 9.38IN

## (undated) DEVICE — BLADE SURG CARBON STEEL SZ11

## (undated) DEVICE — SPONGE DERMACEA GAUZE 4X4

## (undated) DEVICE — SEE MEDLINE ITEM 156955

## (undated) DEVICE — SYR B-D DISP CONTROL 10CC100/C

## (undated) DEVICE — SEE MEDLINE ITEM 157131

## (undated) DEVICE — COVER OVERHEAD SURG LT BLUE

## (undated) DEVICE — SUT ETHILON 4-0 PS2 18 BLK

## (undated) DEVICE — CANNULA VSL W/DUCKBILL

## (undated) DEVICE — APPLICATOR CHLORAPREP ORN 26ML

## (undated) DEVICE — SYR 30CC LUER LOCK

## (undated) DEVICE — ELECTRODE REM PLYHSV RETURN 9

## (undated) DEVICE — JELLY LUBRICANT STERILE 4 OZ

## (undated) DEVICE — SEE MEDLINE ITEM 157116

## (undated) DEVICE — BOOT SUTURE AID

## (undated) DEVICE — PACK BASIC

## (undated) DEVICE — GLOVE BIOGEL ECLIPSE SZ 7.5

## (undated) DEVICE — SEE MEDLINE ITEM 157173

## (undated) DEVICE — SEE MEDLINE ITEM 152622

## (undated) DEVICE — SUT 3-0 VICRYL / SH (J416)

## (undated) DEVICE — SUT PROLENE 6-0 BV-1BV-1 4

## (undated) DEVICE — MANIFOLD 4 PORT

## (undated) DEVICE — PAD PREP 50/CA

## (undated) DEVICE — STOCKINET TUBULAR 1 PLY 6X60IN